# Patient Record
Sex: FEMALE | Race: WHITE | NOT HISPANIC OR LATINO | Employment: FULL TIME | URBAN - METROPOLITAN AREA
[De-identification: names, ages, dates, MRNs, and addresses within clinical notes are randomized per-mention and may not be internally consistent; named-entity substitution may affect disease eponyms.]

---

## 2023-08-15 ENCOUNTER — EVALUATION (OUTPATIENT)
Dept: PHYSICAL THERAPY | Facility: CLINIC | Age: 63
End: 2023-08-15
Payer: COMMERCIAL

## 2023-08-15 DIAGNOSIS — M70.61 TROCHANTERIC BURSITIS OF RIGHT HIP: Primary | ICD-10-CM

## 2023-08-15 PROCEDURE — 97161 PT EVAL LOW COMPLEX 20 MIN: CPT | Performed by: PHYSICAL THERAPIST

## 2023-08-15 PROCEDURE — 97112 NEUROMUSCULAR REEDUCATION: CPT | Performed by: PHYSICAL THERAPIST

## 2023-08-15 NOTE — PROGRESS NOTES
PT Evaluation     Today's date: 8/15/2023  Patient name: Steffany Roth  : 1960  MRN: [id number]  Referring provider: Chip Wright MD  Dx:   Encounter Diagnosis     ICD-10-CM    1. Trochanteric bursitis of right hip  M70.61                      Assessment  Assessment details: 8/15/2023: Steffany Roth is a 61 y.o. female who presents with pain, decreased strength, decreased ROM, postural dysfunction, c/o declining balance and tenderness w/ palpation R lateral hip. Due to these impairments, patient has difficulty performing ADL's, recreational activities, engaging in social activities, ambulation, stair negotiation, lifting/carrying, transfers. Patient's clinical presentation is consistent with their referring diagnosis of Trochanteric bursitis of right hip  (primary encounter diagnosis). She was treated for several months w/ chiropractic and SI joint dysfunction tests are negative. Hip MMT is strong and painfree except for mild weakness and discomfort w/ hip abd w/ associated mild TTP gr trochanter. Pt does respond w/ reduced symptoms during functional activities w/ mechanical assessment of lumbar spine indicating directional preference of lumbar extension. She was educated on potential of lumbar derangement and given HEP to test/treat this. If pt does not report continued relief w/ lumbar extension, repeated movement testing to R hip will be completed next visit. She will benefit from spine mechanical correction, LE and lumbar stretching and hip/core strengthening. We will progress to balance exercises prn based on pt concerns. Patient has been educated in home exercise program and plan of care.  Patient would benefit from skilled physical therapy services to address their aforementioned functional limitations and progress towards prior level of function and independence with home exercise program.     Impairments: abnormal gait, abnormal or restricted ROM, activity intolerance, impaired balance, impaired physical strength, lacks appropriate home exercise program, pain with function, weight-bearing intolerance and poor body mechanics    Goals  Short Term Goals to be accomplished in 4 weeks: (9/13/2023)  STG1: Pt will be I with HEP  STG2: Pt will report 50% reduction in R hip pain w/ stairs and upon waking. STG3: Pt will demo >/= 4+/5 MMT strength R hip abd w/o pain  STG4: IMrove lumbar ROM to [facility] w/o pain to allow improved effectiveness of lumbar mechanical correction exercises to reduce hip pain. Long Term Goals to be accomplished in 12 weeks: (11/7/2023)   LTG1: Pt will achieve full hip AROM to allow reciprocal stairs w/o pain and ease w/ LE dressing  LTG2: Pt will demo hip strength WNL to allow SLS x 30" without pain. LTG3: Pt will return to work/household duties as per PLOF without pain, reaching FOTO score of 69 or better. LTG4: Abolish pain after prolonged sitting/standing/walking allowing pt to walk 20 min or more and stand for an hour w/ no issues. LTG5: Achieve FGA score of 30/30. Plan  Plan details:       Patient would benefit from: PT eval and skilled physical therapy  Planned modality interventions: cryotherapy, thermotherapy: hydrocollator packs and unattended electrical stimulation  Planned therapy interventions: manual therapy, neuromuscular re-education, therapeutic activities, therapeutic exercise, home exercise program, stretching, patient education, postural training, balance/weight bearing training and functional ROM exercises  Frequency: 2x week  Plan of Care beginning date: 8/15/2023  Plan of Care expiration date: 11/7/2023  Treatment plan discussed with: patient        Subjective Evaluation    History of Present Illness  Mechanism of injury: Subjective 8/15/2023: Pt reports R lateral hip pain for more than a year. She did have chiropractic treatment for a few months with only short term relief. 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    Symptoms are intermittent, worse upon waking and w/ prolonged positions; sitting, standing or walking. She feels better as the day goes on. Pain is located in R lateral hip and does not move or radiate. She also mentions constant pain R foot first web space.   Patient Goals  Patient goals for therapy: decreased pain, improved balance and return to sport/leisure activities    Pain  At best pain ratin  At worst pain ratin  Quality: dull ache and tight  Progression: no change    Social Support    Employment status: working (teacher)    Diagnostic Tests  No diagnostic tests performed  Treatments  Previous treatment: chiropractic  Current treatment: physical therapy        Objective     A/PROM:  R  L     8/15/2023 8/15/2023  Hip flex sup  WNL  WNL  Hip ER prone P 50  55  Hip IR prone P  40  40    MMT:    R  L     8/15/2023 8/15/2023  Hip flex   4+/5  4+/5  Hip abd  4/5*  5/5  Hip ER   4+/5  4+/5  Hip IR   4+/5  4+/5  Hip ext   5/5  5/5  Knee ext  4+/5  4+/5  Knee flex  4+/5  4+/5    Lumbar screen: Lumbar AROM limitation     8/15/2023   Flexion:  min   Extension:  mod   R Side glide:  min   L Side glide: Min    DTR's:  8/15/2023: 3+ B/L L4; 2+ B/L S1    Mechanical Assessment:   8/15/2023: pre-test symptoms include 2/10 resting R lat hip pain; 4-5/10 lat hip pain w/ 8" fwd step up  REIL 1x10 = NE  EIL from qped 1x10 = abolish  R SLS 5/10 pain; EIL from qped 5"x5 = dec/B      Function:  8/15/2023 - stairs are intermittently reciprocal due to pain more w/ ascending vs descending;    Patient wakes w/ moderate pain   Moderate pain w/ raising from prolonged sitting   Limited standing/walking tolerance   Pain w/ R SLS     Gait:   8/15/2023 - WNL level surfaces   Pain w/ stairs, typically negotiates stairs non-recip due to pain    Palpation:   8/15/2023 - mild TTP R gr trochanter     Flexibility:   8/15/2023 - min kessler B/L quads; min/mod kessler hams 65 deg B/L; min kessler R hip ER w/ mild ERP      Outcome Measures Initial Eval  8/15/2023 5xSTS 13.09 sec        FGA 24/30        mCTSIB  - FTEO (firm)  - FTEC (firm)  - FTEO (foam)  - FTEC (foam)   60+ sec  60+ sec  60+ sec  NT sec        SLS R EO 30 sec+ 5/10 pain        SLS L EO 30 sec +             Cut off scores: All data taken from [employer] Neuro Section or Rehab Measures    Cr: <46/56=falls risk  MDC: 6 pts  Age Norms:  57-79: M - 54   F - 55  70-79: M - 47   F - 53  80-89: M - 48   F - 50 5xSTS: Amna et al 2010  MDC: 2.3 sec  Age Norms:  60-69: 11.1 sec  70-79: 12.6 sec  80-89: 14.8 sec   TUG  MDC: 4.14 sec  Cut off score:  >13.5 sec community dwelling adults  >32.2 sec frail elderly  <20 sec: I for basic transfers  >30: dependent for transfers 10 Meter Walk Test Norms: Watson Enciso and Clay banda 2011  20-29: M - 1.35 m   F - 1.34 m  30-39: M - 1.43 m   F - 1.34 m  40-49: M - 1.43 m   F - 1.39 m  50-59: M - 1.43 m   F - 1.31 m  60-69: M - 1.34 m   F - 1.24 m  70-79: M - 1.26 m   F - 1.13 m  80-89: M - 0.97 m   F - 0.94 m   FGA  MCID: 4 pts  Geriatrics/community < 22/30 fall risk  Geriatrics/community < 20/30 unexplained falls DGI  MDC: vestibular - 4 pts  MDC: geriatric/community - 3 pts  Falls risk <19/24   6 Minute Walk Test  Age Norms  57-79: M - 1876 ft (571.80 m)  F - 1765 ft (537.98 m)  70-79: M - 1729 ft (527.00 m)  F - 1545 ft (470.92 m)  80-89: M - 1368 ft (416.97 m)  F - 1286 ft (391.97 m) mCTSIB  Norm: 20-60 yrs  Eyes open firm: norm sway 0.21-0.48  Eyes closed firm: norm sway 0.48-0.99  Eyes open foam: norm sway 0.38-0.71  Eyes closed foam: norm sway 0.70-2.22          Precautions: History reviewed. No pertinent past medical history. Access Code: [email or web address]  URL: [email or web address]  Date: 08/15/2023  Prepared by:  Luis Alberto Bolivar    Exercises  - Quadruped Hip Drops  - 6 x daily - 10 reps - 3 hold    SOC: 8/15/0023  FOTO: 8/15/2023  POC EXP: 11/7/2023  DAILY TREATMENT LOG;  date 8/15/2023       Visit #/auth 1                               Neuro Re-Ed 10'       EIL from qped 3" 2x10       Supine sciatic NG        BONNIE w/ B/L knee flexion for fem NG                        Pt educ Lumbar roll; posture importance flex vs exten; import of HEP frequency       HEP Issued & reviewed       Ther Ex        Fig 4 stretch supine        Supine [facility]                                Trial of rep hip exten prn                Ther Activity                        Gait Training                        Modalities

## 2023-08-15 NOTE — LETTER
August 15, 2023    Frida Rivas MD  [address] [address]    Patient: Griselda Nettles   YOB: 1960   Date of Visit: 8/15/2023     Encounter Diagnosis     ICD-10-CM    1. Trochanteric bursitis of right hip  M70.61           Dear Dr. Ariella Swift: Thank you for your recent referral of Griselda Nettles. Please review the attached evaluation summary from Francia's recent visit. Please verify that you agree with the plan of care by signing the attached order. If you have any questions or concerns, please do not hesitate to call. I sincerely appreciate the opportunity to share in the care of one of your patients and hope to have another opportunity to work with you in the near future. Sincerely,    Gee Cortés, PT      Referring Provider:      I certify that I have read the below Plan of Care and certify the need for these services furnished under this plan of treatment while under my care. Frida Rivas MD  [facility]  Via Fax: [phone]          PT Evaluation     Today's date: 8/15/2023  Patient name: Griselda Nettles  : 1960  MRN: [id number]  Referring provider: Frida Rivas MD  Dx:   Encounter Diagnosis     ICD-10-CM    1. Trochanteric bursitis of right hip  M70.61                      Assessment  Assessment details: 8/15/2023: Griselda Nettles is a 61 y.o. female who presents with pain, decreased strength, decreased ROM, postural dysfunction, c/o declining balance and tenderness w/ palpation R lateral hip. Due to these impairments, patient has difficulty performing ADL's, recreational activities, engaging in social activities, ambulation, stair negotiation, lifting/carrying, transfers. Patient's clinical presentation is consistent with their referring diagnosis of Trochanteric bursitis of right hip  (primary encounter diagnosis). She was treated for several months w/ chiropractic and SI joint dysfunction tests are negative.  Hip MMT is strong and painfree except for mild weakness and discomfort w/ hip abd w/ associated mild TTP gr trochanter. Pt does respond w/ reduced symptoms during functional activities w/ mechanical assessment of lumbar spine indicating directional preference of lumbar extension. She was educated on potential of lumbar derangement and given HEP to test/treat this. If pt does not report continued relief w/ lumbar extension, repeated movement testing to R hip will be completed next visit. She will benefit from spine mechanical correction, LE and lumbar stretching and hip/core strengthening. We will progress to balance exercises prn based on pt concerns. Patient has been educated in home exercise program and plan of care. Patient would benefit from skilled physical therapy services to address their aforementioned functional limitations and progress towards prior level of function and independence with home exercise program.     Impairments: abnormal gait, abnormal or restricted ROM, activity intolerance, impaired balance, impaired physical strength, lacks appropriate home exercise program, pain with function, weight-bearing intolerance and poor body mechanics    Goals  Short Term Goals to be accomplished in 4 weeks: (9/13/2023)  STG1: Pt will be I with HEP  STG2: Pt will report 50% reduction in R hip pain w/ stairs and upon waking. STG3: Pt will demo >/= 4+/5 MMT strength R hip abd w/o pain  STG4: IMrove lumbar ROM to [facility] w/o pain to allow improved effectiveness of lumbar mechanical correction exercises to reduce hip pain. Long Term Goals to be accomplished in 12 weeks: (11/7/2023)   LTG1: Pt will achieve full hip AROM to allow reciprocal stairs w/o pain and ease w/ LE dressing  LTG2: Pt will demo hip strength WNL to allow SLS x 30" without pain. LTG3: Pt will return to work/household duties as per PLOF without pain, reaching FOTO score of 69 or better.   LTG4: Abolish pain after prolonged sitting/standing/walking allowing pt to walk 20 min or more and stand for an hour w/ no issues. LTG5: Achieve FGA score of 30/30. Plan  Plan details:       Patient would benefit from: PT eval and skilled physical therapy  Planned modality interventions: cryotherapy, thermotherapy: hydrocollator packs and unattended electrical stimulation  Planned therapy interventions: manual therapy, neuromuscular re-education, therapeutic activities, therapeutic exercise, home exercise program, stretching, patient education, postural training, balance/weight bearing training and functional ROM exercises  Frequency: 2x week  Plan of Care beginning date: 8/15/2023  Plan of Care expiration date: 2023  Treatment plan discussed with: patient        Subjective Evaluation    History of Present Illness  Mechanism of injury: Subjective 8/15/2023: Pt reports R lateral hip pain for more than a year. She did have chiropractic treatment for a few months with only short term relief. 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 Symptoms are intermittent, worse upon waking and w/ prolonged positions; sitting, standing or walking. She feels better as the day goes on. Pain is located in R lateral hip and does not move or radiate. She also mentions constant pain R foot first web space.   Patient Goals  Patient goals for therapy: decreased pain, improved balance and return to sport/leisure activities    Pain  At best pain ratin  At worst pain ratin  Quality: dull ache and tight  Progression: no change    Social Support    Employment status: working (teacher)    Diagnostic Tests  No diagnostic tests performed  Treatments  Previous treatment: chiropractic  Current treatment: physical therapy        Objective     A/PROM:  R  L     8/15/2023 8/15/2023  Hip flex sup  WNL  WNL  Hip ER prone P 50  55  Hip IR prone P  40  40    MMT:    R  L     8/15/2023 8/15/2023  Hip flex   4+/5  4+/5  Hip abd  4/5*  5/5  Hip ER   4+/5  4+/5  Hip IR   4+/5  4+/5  Hip ext   5/5  5/5  Knee ext  4+/5  4+/5  Knee flex  4+/5  4+/5    Lumbar screen: Lumbar AROM limitation     8/15/2023   Flexion:  min   Extension:  mod   R Side glide:  min   L Side glide: Min    DTR's:  8/15/2023: 3+ B/L L4; 2+ B/L S1    Mechanical Assessment:   8/15/2023: pre-test symptoms include 2/10 resting R lat hip pain; 4-5/10 lat hip pain w/ 8" fwd step up  REIL 1x10 = NE  EIL from qped 1x10 = abolish  R SLS 5/10 pain; EIL from qped 5"x5 = dec/B      Function:  8/15/2023 - stairs are intermittently reciprocal due to pain more w/ ascending vs descending;    Patient wakes w/ moderate pain   Moderate pain w/ raising from prolonged sitting   Limited standing/walking tolerance   Pain w/ R SLS     Gait:   8/15/2023 - WNL level surfaces   Pain w/ stairs, typically negotiates stairs non-recip due to pain    Palpation:   8/15/2023 - mild TTP R gr trochanter     Flexibility:   8/15/2023 - min kessler B/L quads; min/mod kessler hams 65 deg B/L; min kessler R hip ER w/ mild ERP      Outcome Measures Initial Eval  8/15/2023        5xSTS 13.09 sec        FGA 24/30        mCTSIB  - FTEO (firm)  - FTEC (firm)  - FTEO (foam)  - FTEC (foam)   60+ sec  60+ sec  60+ sec  NT sec        SLS R EO 30 sec+ 5/10 pain        SLS L EO 30 sec +             Cut off scores: All data taken from [employer] Neuro Section or Rehab Measures    Cr: <46/56=falls risk  MDC: 6 pts  Age Norms:  57-79: M - 54   F - 55  70-79: M - 47   F - 53  80-89: M - 48   F - 50 5xSTS: Amna et al 2010  MDC: 2.3 sec  Age Norms:  60-69: 11.1 sec  70-79: 12.6 sec  80-89: 14.8 sec   TUG  MDC: 4.14 sec  Cut off score:  >13.5 sec community dwelling adults  >32.2 sec frail elderly  <20 sec:  I for basic transfers  >30: dependent for transfers 10 Meter Walk Test Norms: Tosha banda 2011  20-29: M - 1.35 m   F - 1.34 m  30-39: M - 1.43 m   F - 1.34 m  40-49: M - 1.43 m   F - 1.39 m  50-59: M - 1.43 m   F - 1.31 m  60-69: M - 1.34 m   F - 1.24 m  70-79: M - 1.26 m   F - 1.13 m  80-89: M - 0.97 m   F - 0.94 m   FGA  MCID: 4 pts  Geriatrics/community < 22/30 fall risk  Geriatrics/community < 20/30 unexplained falls DGI  MDC: vestibular - 4 pts  MDC: geriatric/community - 3 pts  Falls risk <19/24   6 Minute Walk Test  Age Norms  57-79: M - 1876 ft (571.80 m)  F - 1765 ft (537.98 m)  70-79: M - 1729 ft (527.00 m)  F - 1545 ft (470.92 m)  80-89: M - 1368 ft (416.97 m)  F - 1286 ft (391.97 m) mCTSIB  Norm: 20-60 yrs  Eyes open firm: norm sway 0.21-0.48  Eyes closed firm: norm sway 0.48-0.99  Eyes open foam: norm sway 0.38-0.71  Eyes closed foam: norm sway 0.70-2.22         Precautions: History reviewed. No pertinent past medical history. Access Code: [email or web address]  URL: [email or web address]  Date: 08/15/2023  Prepared by:  Verenice Drake    Exercises  - Quadruped Hip Drops  - 6 x daily - 10 reps - 3 hold    SOC: 8/15/0023  FOTO: 8/15/2023  POC EXP: 11/7/2023  DAILY TREATMENT LOG;  date 8/15/2023       Visit #/auth 1                               Neuro Re-Ed 10'       EIL from qped 3" 2x10       Supine sciatic NG        BONNIE w/ B/L knee flexion for fem NG                        Pt educ Lumbar roll; posture importance flex vs exten; import of HEP frequency       HEP Issued & reviewed       Ther Ex        Fig 4 stretch supine        Supine [facility]                                Trial of rep hip exten prn                Ther Activity                        Gait Training                        Modalities

## 2023-08-15 NOTE — LETTER
August 15, 2023      No Recipients    Patient: Maria Del Carmen Brambila   YOB: 1960   Date of Visit: 8/15/2023     Encounter Diagnosis     ICD-10-CM    1. Trochanteric bursitis of right hip  M70.61           Dear Dr. Alva Marie Recipients: Thank you for your recent referral of Maria Del Carmen Brambila. Please review the attached evaluation summary from Francia's recent visit. Please verify that you agree with the plan of care by signing the attached order. If you have any questions or concerns, please do not hesitate to call. I sincerely appreciate the opportunity to share in the care of one of your patients and hope to have another opportunity to work with you in the near future. Sincerely,    Amrit Vera, PT      Referring Provider:      I certify that I have read the below Plan of Care and certify the need for these services furnished under this plan of treatment while under my care. No Recipients          PT Evaluation     Today's date: 8/15/2023  Patient name: Maria Del Carmen Brambila  : 1960  MRN: [id number]  Referring provider: Keenan Araujo MD  Dx:   Encounter Diagnosis     ICD-10-CM    1. Trochanteric bursitis of right hip  M70.61                      Assessment  Assessment details: 8/15/2023: Maria Del Carmen Brambila is a 61 y.o. female who presents with pain, decreased strength, decreased ROM, postural dysfunction, c/o declining balance and tenderness w/ palpation R lateral hip. Due to these impairments, patient has difficulty performing ADL's, recreational activities, engaging in social activities, ambulation, stair negotiation, lifting/carrying, transfers. Patient's clinical presentation is consistent with their referring diagnosis of Trochanteric bursitis of right hip  (primary encounter diagnosis). She was treated for several months w/ chiropractic and SI joint dysfunction tests are negative.  Hip MMT is strong and painfree except for mild weakness and discomfort w/ hip abd w/ associated mild TTP gr trochanter. Pt does respond w/ reduced symptoms during functional activities w/ mechanical assessment of lumbar spine indicating directional preference of lumbar extension. She was educated on potential of lumbar derangement and given HEP to test/treat this. If pt does not report continued relief w/ lumbar extension, repeated movement testing to R hip will be completed next visit. She will benefit from spine mechanical correction, LE and lumbar stretching and hip/core strengthening. We will progress to balance exercises prn based on pt concerns. Patient has been educated in home exercise program and plan of care. Patient would benefit from skilled physical therapy services to address their aforementioned functional limitations and progress towards prior level of function and independence with home exercise program.     Impairments: abnormal gait, abnormal or restricted ROM, activity intolerance, impaired balance, impaired physical strength, lacks appropriate home exercise program, pain with function, weight-bearing intolerance and poor body mechanics    Goals  Short Term Goals to be accomplished in 4 weeks: (9/13/2023)  STG1: Pt will be I with HEP  STG2: Pt will report 50% reduction in R hip pain w/ stairs and upon waking. STG3: Pt will demo >/= 4+/5 MMT strength R hip abd w/o pain  STG4: IMrove lumbar ROM to [facility] w/o pain to allow improved effectiveness of lumbar mechanical correction exercises to reduce hip pain. Long Term Goals to be accomplished in 12 weeks: (11/7/2023)   LTG1: Pt will achieve full hip AROM to allow reciprocal stairs w/o pain and ease w/ LE dressing  LTG2: Pt will demo hip strength WNL to allow SLS x 30" without pain. LTG3: Pt will return to work/household duties as per PLOF without pain, reaching FOTO score of 69 or better. LTG4: Abolish pain after prolonged sitting/standing/walking allowing pt to walk 20 min or more and stand for an hour w/ no issues.   LTG5: Achieve FGA score of 30/30. Plan  Plan details:       Patient would benefit from: PT eval and skilled physical therapy  Planned modality interventions: cryotherapy, thermotherapy: hydrocollator packs and unattended electrical stimulation  Planned therapy interventions: manual therapy, neuromuscular re-education, therapeutic activities, therapeutic exercise, home exercise program, stretching, patient education, postural training, balance/weight bearing training and functional ROM exercises  Frequency: 2x week  Plan of Care beginning date: 8/15/2023  Plan of Care expiration date: 2023  Treatment plan discussed with: patient        Subjective Evaluation    History of Present Illness  Mechanism of injury: Subjective 8/15/2023: Pt reports R lateral hip pain for more than a year. She did have chiropractic treatment for a few months with only short term relief. 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 Symptoms are intermittent, worse upon waking and w/ prolonged positions; sitting, standing or walking. She feels better as the day goes on. Pain is located in R lateral hip and does not move or radiate. She also mentions constant pain R foot first web space.   Patient Goals  Patient goals for therapy: decreased pain, improved balance and return to sport/leisure activities    Pain  At best pain ratin  At worst pain ratin  Quality: dull ache and tight  Progression: no change    Social Support    Employment status: working (teacher)    Diagnostic Tests  No diagnostic tests performed  Treatments  Previous treatment: chiropractic  Current treatment: physical therapy        Objective     A/PROM:  R  L     8/15/2023 8/15/2023  Hip flex sup  WNL  WNL  Hip ER prone P 50  55  Hip IR prone P  40  40    MMT:    R  L     8/15/2023 8/15/2023  Hip flex   4+/5  4+/5  Hip abd  4/5*  5/5  Hip ER   4+/5  4+/5  Hip IR   4+/5  4+/5  Hip ext   5/5  5/5  Knee ext  4+/5  4+/5  Knee flex  4+/5  4+/5    Lumbar screen: Lumbar AROM limitation     8/15/2023   Flexion:  min   Extension:  mod   R Side glide:  min   L Side glide: Min    DTR's:  8/15/2023: 3+ B/L L4; 2+ B/L S1    Mechanical Assessment:   8/15/2023: pre-test symptoms include 2/10 resting R lat hip pain; 4-5/10 lat hip pain w/ 8" fwd step up  REIL 1x10 = NE  EIL from qped 1x10 = abolish  R SLS 5/10 pain; EIL from qped 5"x5 = dec/B      Function:  8/15/2023 - stairs are intermittently reciprocal due to pain more w/ ascending vs descending;    Patient wakes w/ moderate pain   Moderate pain w/ raising from prolonged sitting   Limited standing/walking tolerance   Pain w/ R SLS     Gait:   8/15/2023 - WNL level surfaces   Pain w/ stairs, typically negotiates stairs non-recip due to pain    Palpation:   8/15/2023 - mild TTP R gr trochanter     Flexibility:   8/15/2023 - min kessler B/L quads; min/mod kessler hams 65 deg B/L; min kessler R hip ER w/ mild ERP      Outcome Measures Initial Eval  8/15/2023        5xSTS 13.09 sec        FGA 24/30        mCTSIB  - FTEO (firm)  - FTEC (firm)  - FTEO (foam)  - FTEC (foam)   60+ sec  60+ sec  60+ sec  NT sec        SLS R EO 30 sec+ 5/10 pain        SLS L EO 30 sec +             Cut off scores: All data taken from [facility] Neuro Section or Rehab Measures    Cr: <46/56=falls risk  MDC: 6 pts  Age Norms:  57-79: M - 54   F - 55  70-79: M - 47   F - 53  80-89: M - 48   F - 50 5xSTS: Amna et al 2010  MDC: 2.3 sec  Age Norms:  60-69: 11.1 sec  70-79: 12.6 sec  80-89: 14.8 sec   TUG  MDC: 4.14 sec  Cut off score:  >13.5 sec community dwelling adults  >32.2 sec frail elderly  <20 sec:  I for basic transfers  >30: dependent for transfers 10 Meter Walk Test Norms: Akashcuate Grossocent al 2011  20-29: M - 1.35 m   F - 1.34 m  30-39: M - 1.43 m   F - 1.34 m  40-49: M - 1.43 m   F - 1.39 m  50-59: M - 1.43 m   F - 1.31 m  60-69: M - 1.34 m   F - 1.24 m  70-79: M - 1.26 m   F - 1.13 m  80-89: M - 0.97 m   F - 0.94 m FGA  MCID: 4 pts  Geriatrics/community < 22/30 fall risk  Geriatrics/community < 20/30 unexplained falls DGI  MDC: vestibular - 4 pts  MDC: geriatric/community - 3 pts  Falls risk <19/24   6 Minute Walk Test  Age Norms  57-79: M - 1876 ft (571.80 m)  F - 1765 ft (537.98 m)  70-79: M - 1729 ft (527.00 m)  F - 1545 ft (470.92 m)  80-89: M - 1368 ft (416.97 m)  F - 1286 ft (391.97 m) mCTSIB  Norm: 20-60 yrs  Eyes open firm: norm sway 0.21-0.48  Eyes closed firm: norm sway 0.48-0.99  Eyes open foam: norm sway 0.38-0.71  Eyes closed foam: norm sway 0.70-2.22         Precautions: ***  Access Code: [email or web address]  URL: [email or web address]  Date: 08/15/2023  Prepared by:  Chinedu Molina    Exercises  - Quadruped Hip Drops  - 6 x daily - 10 reps - 3 hold    SOC: 8/15/0023  FOTO: 8/15/2023  POC EXP: 11/7/2023  DAILY TREATMENT LOG;  date 8/15/2023       Visit #/auth 1                               Neuro Re-Ed 10'       EIL from qped 3" 2x10       Supine sciatic NG        BONNIE w/ B/L knee flexion for fem NG                        Pt educ Lumbar roll; posture importance flex vs exten; import of HEP frequency       HEP Issued & reviewed       Ther Ex        Fig 4 stretch supine        Supine [facility]                                Trial of rep hip exten prn                Ther Activity                        Gait Training                        Modalities

## 2023-08-17 ENCOUNTER — OFFICE VISIT (OUTPATIENT)
Dept: PHYSICAL THERAPY | Facility: CLINIC | Age: 63
End: 2023-08-17
Payer: COMMERCIAL

## 2023-08-17 DIAGNOSIS — M70.61 TROCHANTERIC BURSITIS OF RIGHT HIP: Primary | ICD-10-CM

## 2023-08-17 PROCEDURE — 97110 THERAPEUTIC EXERCISES: CPT | Performed by: PHYSICAL THERAPIST

## 2023-08-17 PROCEDURE — 97112 NEUROMUSCULAR REEDUCATION: CPT | Performed by: PHYSICAL THERAPIST

## 2023-08-17 NOTE — PROGRESS NOTES
Daily Note     Today's date: 2023  Patient name: Dona Allen  : 1960  MRN: [id number]  Referring provider: Tarah Chu MD  Dx:   Encounter Diagnosis     ICD-10-CM    1. Trochanteric bursitis of right hip  M70.61                      Subjective: Pt reports her R hip pain is almost fully resolved w/ her HEP, her abdominal muscles are sore from qped hip drops. Objective: See treatment diary below  Lumbar AROM remains consistent w/ IE  Hip abd MMT 4+/5 - no pain, improved  R SLS mild pain. EiL from qped = dec/B  Stretches = NE  Rep hip ext knee on chair 1x10 = abolish    Assessment: Tolerated treatment well and just generally is not used to doing exercises. . Patient would benefit from continued PT and review of HEP updates today. Pt reports concern about whether her pain will increase again when school starts and she goes back to work. She wants to continue PT until she has an opportunity to test her response to working. Plan: Continue per plan of care. updated HEP. Precautions: History reviewed. No pertinent past medical history. Access Code: [email or web address]  URL: [email or web address]  Date: 08/15/2023  Prepared by:  Fe Paulino    Exercises  - Quadruped Hip Drops  - 6 x daily - 10 reps - 3 hold    SOC: 8/15/0023  FOTO: 8/15/2023  POC EXP: 2023  DAILY TREATMENT LOG;  date 8/15/2023 2023      Visit #/auth 1                               Neuro Re-Ed 10' 20'      EIL from qped 3" 2x10 1x10      Supine sciatic NG knee ext w/ 2 ankle pumps; SOS behind thigh  1x10 R/L      BONNIE w/ B/L knee flexion for fem NG  1x10                      Pt educ Lumbar roll; posture importance flex vs exten; import of HEP frequency       HEP Issued & reviewed Update & review      Ther Ex  20'      Fig 4 stretch supine  20"x2 R/L      Supine clamshells        bridges  1x10                              Trial of rep hip exten prn  R hip 1x10 = abolish              Ther Activity Gait Training                        Modalities                        HEP:  Access Code: [email or web address]  URL: [email or web address]  Date: 08/17/2023  Prepared by:  Pauline May    Exercises  - Quadruped Hip Drops  - 4 x daily - 10 reps - 3 hold  - Supine Bridge  - 1 x daily - 15 reps  - Supine Sciatic Nerve Glide  - 1 x daily - 15 reps  - Prone on elbows with knee flexion  - 1 x daily - 15 reps  - Standing Hip Flexor Stretch on Chair (Mirrored)  - 1 x daily - 10 reps

## 2023-08-22 ENCOUNTER — OFFICE VISIT (OUTPATIENT)
Dept: PHYSICAL THERAPY | Facility: CLINIC | Age: 63
End: 2023-08-22
Payer: COMMERCIAL

## 2023-08-22 DIAGNOSIS — M70.61 TROCHANTERIC BURSITIS OF RIGHT HIP: Primary | ICD-10-CM

## 2023-08-22 PROCEDURE — 97110 THERAPEUTIC EXERCISES: CPT

## 2023-08-22 NOTE — PROGRESS NOTES
Daily Note     Today's date: 2023  Patient name: Andrei Funez  : 1960  MRN: [id number]  Referring provider: Terrance Sims MD  Dx:   Encounter Diagnosis     ICD-10-CM    1. Trochanteric bursitis of right hip  M70.61                      Subjective: pt reports she is having more stiffness today than pain. She worked yesterday and today standing for most of the day and only has some tightness but not a return of her pain that she was having       Objective: See treatment diary below      Assessment: Tolerated treatment well. Pt dem decreased stiffness post session. Pt edu in alternative methods of lumbar ext now that she is back to work and unable to get on the floor at work she is do perform her REIL from QP at least 2x/day (AM/PM) and use the other tools t/o the day instead of completely ignoring them with her work routine, pt dem an understanding. Fatigued with posterolateral hip strengthening. Patient would benefit from continued PT      Plan: Continue per plan of care. Precautions: History reviewed. No pertinent past medical history. Access Code: [email or web address]  URL: [email or web address]  Date: 08/15/2023  Prepared by:  Rafael Doe    Exercises  - Quadruped Hip Drops  - 6 x daily - 10 reps - 3 hold    SOC: 8/15/0023  FOTO: 8/15/2023  POC EXP: 2023  DAILY TREATMENT LOG;  date 8/15/2023 2023 2023     Visit #/auth 1 2/12 3/12                             Neuro Re-Ed 10' 20' 10'     EIL from qped 3" 2x10 1x10 1x10 to end      REIL w/ onL LE on floor    1x10      Supine sciatic NG knee ext w/ 2 ankle pumps; SOS behind thigh  1x10 R/L 1x15 R/L      BONNIE w/ B/L knee flexion for fem NG  1x10 15x                      Pt educ Lumbar roll; posture importance flex vs exten; import of HEP frequency       HEP Issued & reviewed Update & review      Ther Ex  20' 25'     Fig 4 stretch supine  20"x2 R/L 20"x3 R/L      Supine clamshells   GTB 2x10      bridges  1x10 2x10      Standing hip abd/ext    1x10 ea R/L         YTB @ knees         YTB @ knees      Trial of rep hip exten prn  R hip 1x10 = abolish 1x10 R             Ther Activity                        Gait Training                        Modalities                        HEP:  Access Code: [email or web address]  URL: [email or web address]  Date: 08/17/2023  Prepared by:  Radha Hopper    Exercises  - Quadruped Hip Drops  - 4 x daily - 10 reps - 3 hold  - Supine Bridge  - 1 x daily - 15 reps  - Supine Sciatic Nerve Glide  - 1 x daily - 15 reps  - Prone on elbows with knee flexion  - 1 x daily - 15 reps  - Standing Hip Flexor Stretch on Chair (Mirrored)  - 1 x daily - 10 reps

## 2023-08-23 ENCOUNTER — APPOINTMENT (OUTPATIENT)
Dept: PHYSICAL THERAPY | Facility: CLINIC | Age: 63
End: 2023-08-23
Payer: COMMERCIAL

## 2023-08-29 ENCOUNTER — OFFICE VISIT (OUTPATIENT)
Dept: PHYSICAL THERAPY | Facility: CLINIC | Age: 63
End: 2023-08-29
Payer: COMMERCIAL

## 2023-08-29 DIAGNOSIS — M70.61 TROCHANTERIC BURSITIS OF RIGHT HIP: Primary | ICD-10-CM

## 2023-08-29 PROCEDURE — 97110 THERAPEUTIC EXERCISES: CPT

## 2023-08-29 PROCEDURE — 97112 NEUROMUSCULAR REEDUCATION: CPT

## 2023-08-29 NOTE — PROGRESS NOTES
Daily Note     Today's date: 2023  Patient name: Alie Reyes  : 1960  MRN: [id number]  Referring provider: Della Marie MD  Dx:   Encounter Diagnosis     ICD-10-CM    1. Trochanteric bursitis of right hip  M70.61                      Subjective: pt reports that she is feeling stiff today, she was doing a lot of sitting the last 2 days in meetings       Objective: See treatment diary below      Assessment: Tolerated treatment well. Pt dem decreased stiffness post session. Challenged with additional LE strengthening on leg press. Cont to progress posterolateral and core strengthening as tolerated with RTW. Patient would benefit from continued PT      Plan: Continue per plan of care. Precautions: History reviewed. No pertinent past medical history. Access Code: [email or web address]  URL: [email or web address]  Date: 08/15/2023  Prepared by:  Jose Mackey    Exercises  - Quadruped Hip Drops  - 6 x daily - 10 reps - 3 hold    SOC: 8/15/0023  FOTO: 8/15/2023  POC EXP: 2023  DAILY TREATMENT LOG;  date 8/15/2023 2023 2023 2023    Visit #/auth 1 2/12 3/12 4/12                            Neuro Re-Ed 10' 20' 10' 15'    EIL from qped 3" 2x10 1x10 1x10 to end  1x10; 1x10     REIL w/ onL LE on floor    1x10      Supine sciatic NG knee ext w/ 2 ankle pumps; SOS behind thigh  1x10 R/L 1x15 R/L  1x15 R/L     BONNIE w/ B/L knee flexion for fem NG  1x10 15x  15x     Prone quad stretch w/ sos     20"x3 R/L    Supine leg press     B/L 20# 2x10     Supine hip flex stretch over edge w/ sos     20"x3 R/L     Pt educ Lumbar roll; posture importance flex vs exten; import of HEP frequency       HEP Issued & reviewed Update & review      Ther Ex  20' 25' 25'    Fig 4 stretch supine  20"x2 R/L 20"x3 R/L  20"x3 R/L     Supine clamshells   GTB 2x10  GTB 2x10     bridges  1x10 2x10  2x10     Standing hip abd/ext    1x10 ea R/L  YTB @ knees 1x10 ea R/L     Side stepping   YTB @ knees 20'x2 YTB @ knees 20'x2 Monster walks    YTB @ knees 20'x2 YTB @ knees 20'x2     Trial of rep hip exten prn  R hip 1x10 = abolish 1x10 R             Ther Activity                        Gait Training                        Modalities                        HEP:  Access Code: [email or web address]  URL: [email or web address]  Date: 08/17/2023  Prepared by:  Gilma Espinal    Exercises  - Quadruped Hip Drops  - 4 x daily - 10 reps - 3 hold  - Supine Bridge  - 1 x daily - 15 reps  - Supine Sciatic Nerve Glide  - 1 x daily - 15 reps  - Prone on elbows with knee flexion  - 1 x daily - 15 reps  - Standing Hip Flexor Stretch on Chair (Mirrored)  - 1 x daily - 10 reps

## 2023-08-31 ENCOUNTER — OFFICE VISIT (OUTPATIENT)
Dept: PHYSICAL THERAPY | Facility: CLINIC | Age: 63
End: 2023-08-31
Payer: COMMERCIAL

## 2023-08-31 DIAGNOSIS — M70.61 TROCHANTERIC BURSITIS OF RIGHT HIP: Primary | ICD-10-CM

## 2023-08-31 PROCEDURE — 97112 NEUROMUSCULAR REEDUCATION: CPT | Performed by: PHYSICAL THERAPIST

## 2023-08-31 PROCEDURE — 97110 THERAPEUTIC EXERCISES: CPT | Performed by: PHYSICAL THERAPIST

## 2023-08-31 PROCEDURE — 97140 MANUAL THERAPY 1/> REGIONS: CPT | Performed by: PHYSICAL THERAPIST

## 2023-08-31 NOTE — PROGRESS NOTES
Daily Note     Today's date: 2023  Patient name: Oralia Grider  : 1960  MRN: [id number]  Referring provider: Chandrakant Morgan MD  Dx:   Encounter Diagnosis     ICD-10-CM    1. Trochanteric bursitis of right hip  M70.61                      Subjective: Pt reports no pain upon arrival, just some minor stiffness and general fatigue after RTW this week. Pt had no c/o except pain w/ R LE fwd step up. Pt states this has been painful as long as she can remember, but is hard for her to monitor since she doesn't do stairs often. Objective: See treatment diary below  RE: R hip pain w/ step up:  EIL from qped = NE  Prone lumb ext mobs = NE  Rep hip ext knee on chair 1x10 = NE  Rep hip ext manual in SL 2x10 = NE    Assessment: Tolerated treatment well and demonstrates improved lumbar mobility and reports decreased pain overall. . Patient would benefit from continued PT  Pain w/ fwd step up R LE unchanged today w/ intervention. Plan: Continue per plan of care.       SOC: 8/15/0023  FOTO: 8/15/2023  POC EXP: 2023  DAILY TREATMENT LOG;  date 8/15/2023 2023 2023 2023 2023   Visit #/auth 1 2/12 3/12 4/12 5/12   manual     10'   R hip exten stretch     2x10 = NE R fwd stepup   Prone lumb exten mobs     Gr 3-4 NE R fwd stepup   Neuro Re-Ed 10' 20' 10' 15' 15'   Slouch overcorrect     1x10   EIL from qped 3" 2x10 1x10 1x10 to end  1x10; 1x10  1x10   REIL w/ onL LE on floor    1x10      Supine sciatic NG knee ext w/ 2 ankle pumps; SOS behind thigh  1x10 R/L 1x15 R/L  1x15 R/L  1x15 R/L   BONNIE w/ B/L knee flexion for fem NG  1x10 15x  15x  15x   Prone quad stretch w/ sos     20"x3 R/L 20"x3 ea R/L semi prone contra foot on floor   Supine leg press     B/L 20# 2x10     Supine hip flex stretch over edge w/ sos     20"x3 R/L     Pt educ Lumbar roll; posture importance flex vs exten; import of HEP frequency       HEP Issued & reviewed Update & review      Ther Ex  20' 25' 25' 15'   Fig 4 stretch supine  20"x2 R/L 20"x3 R/L  20"x3 R/L  20"x3 R/L   Supine clamshells   GTB 2x10  GTB 2x10     bridges  1x10 2x10  2x10  W/ TB abd 2x10   Standing hip abd/ext    1x10 ea R/L  YTB @ knees 1x10 ea R/L  Steamboats RTB @ knees 1x10 R/L   Side stepping   YTB @ knees 20'x2 YTB @ knees 20'x2 RTB @ knees 20'x2 R/L   Monster walks    YTB @ knees 20'x2 YTB @ knees 20'x2     Trial of rep hip exten prn  R hip 1x10 = abolish 1x10 R  1x10           Ther Activity                        Gait Training                        Modalities                        HEP:  Access Code: [email or web address]  URL: [email or web address]  Date: 08/17/2023  Prepared by:  Cece Marquis    Exercises  - Quadruped Hip Drops  - 4 x daily - 10 reps - 3 hold  - Supine Bridge  - 1 x daily - 15 reps  - Supine Sciatic Nerve Glide  - 1 x daily - 15 reps  - Prone on elbows with knee flexion  - 1 x daily - 15 reps  - Standing Hip Flexor Stretch on Chair (Mirrored)  - 1 x daily - 10 reps

## 2023-09-05 ENCOUNTER — OFFICE VISIT (OUTPATIENT)
Dept: PHYSICAL THERAPY | Facility: CLINIC | Age: 63
End: 2023-09-05
Payer: COMMERCIAL

## 2023-09-05 DIAGNOSIS — M70.61 TROCHANTERIC BURSITIS OF RIGHT HIP: Primary | ICD-10-CM

## 2023-09-05 PROCEDURE — 97110 THERAPEUTIC EXERCISES: CPT | Performed by: PHYSICAL THERAPIST

## 2023-09-05 PROCEDURE — 97112 NEUROMUSCULAR REEDUCATION: CPT | Performed by: PHYSICAL THERAPIST

## 2023-09-05 NOTE — PROGRESS NOTES
Daily Note     Today's date: 2023  Patient name: Barb Mora  : 1960  MRN: [id number]  Referring provider: Ruchi Davis MD  Dx:   Encounter Diagnosis     ICD-10-CM    1. Trochanteric bursitis of right hip  M70.61                      Subjective: Pt reports she is much better overall, waking w/ much less stiffness and tolerating more walking/weight bearing activity. Objective: See treatment diary below      Assessment: Tolerated treatment well and reports fatigue w/ current program. Patient would benefit from continued PT and progression of functional activities as tolerated. Plan: Continue per plan of care.       SOC: 8/15/0023  FOTO: 8/15/2023  POC EXP: 2023  DAILY TREATMENT LOG;  date 2023   Visit #/auth 6/12 2/12 3/12 4/12 5/12   manual     10'   R hip exten stretch     2x10 = NE R fwd stepup   Prone lumb exten mobs     Gr 3-4 NE R fwd stepup   Neuro Re-Ed 15' 20' 10' 15' 15'   Slouch overcorrect     1x10   EIL from qped 1x10 1x10 1x10 to end  1x10; 1x10  1x10   REIL w/ onL LE on floor    1x10      Supine sciatic NG knee ext w/ 2 ankle pumps; SOS behind thigh 1x10 R/L sit 1x10 R/L 1x15 R/L  1x15 R/L  1x15 R/L   BONNIE w/ B/L knee flexion for fem NG 15x 1x10 15x  15x  15x   Prone quad stretch w/ sos     20"x3 R/L 20"x3 ea R/L semi prone contra foot on floor   upright leg press  30# 2x10 B/L   B/L 20# 2x10     Supine hip flex stretch over edge w/ sos  30"x3 R/L   20"x3 R/L     Pt educ        HEP  Update & review      Ther Ex 15' 20' 25' 25' 15'   Fig 4 stretch supine 20"x3 R/L 20"x2 R/L 20"x3 R/L  20"x3 R/L  20"x3 R/L   Supine clamshells   GTB 2x10  GTB 2x10     bridges W/ TB abd 2x10 grn 1x10 2x10  2x10  W/ TB abd grn 2x10   Standing hip abd/ext  RTB @ knees 2x10 ea R/L  1x10 ea R/L  YTB @ knees 1x10 ea R/L  Steamboats RTB @ knees 1x10 R/L   Side stepping   YTB @ knees 20'x2 YTB @ knees 20'x2 RTB @ knees 20'x2 R/L   Monster walks    YTB @ knees 20'x2 YTB @ knees 20'x2     Trial of rep hip exten prn  R hip 1x10 = abolish 1x10 R  1x10   High march walking 10'x4       Ther Activity                        Gait Training                        Modalities                        HEP:  Access Code: [email or web address]  URL: [email or web address]  Date: 08/17/2023  Prepared by:  Teresa Arrington    Exercises  - Quadruped Hip Drops  - 4 x daily - 10 reps - 3 hold  - Supine Bridge  - 1 x daily - 15 reps  - Supine Sciatic Nerve Glide  - 1 x daily - 15 reps  - Prone on elbows with knee flexion  - 1 x daily - 15 reps  - Standing Hip Flexor Stretch on Chair (Mirrored)  - 1 x daily - 10 reps

## 2023-09-07 ENCOUNTER — OFFICE VISIT (OUTPATIENT)
Dept: PHYSICAL THERAPY | Facility: CLINIC | Age: 63
End: 2023-09-07
Payer: COMMERCIAL

## 2023-09-07 DIAGNOSIS — M70.61 TROCHANTERIC BURSITIS OF RIGHT HIP: Primary | ICD-10-CM

## 2023-09-07 PROCEDURE — 97112 NEUROMUSCULAR REEDUCATION: CPT | Performed by: PHYSICAL THERAPIST

## 2023-09-07 PROCEDURE — 97110 THERAPEUTIC EXERCISES: CPT | Performed by: PHYSICAL THERAPIST

## 2023-09-07 NOTE — PROGRESS NOTES
Daily Note     Today's date: 2023  Patient name: Darian Jain  : 1960  MRN: [id number]  Referring provider: Thea Godfrey MD  Dx:   Encounter Diagnosis     ICD-10-CM    1. Trochanteric bursitis of right hip  M70.61                      Subjective: Pt reports she's pretty close to being back to normal. She's been walking a lot at school (work) and is doing OK w/ it. Objective: See treatment diary below      Assessment: Tolerated treatment well. Patient nearing D/C. Plan: Progress note during next visit. Potential D/C next week.      SOC: 8/15/0023  FOTO: 8/15/2023  POC EXP: 2023  DAILY TREATMENT LOG;  date 2023   Visit #/auth    manual     10'   R hip exten stretch     2x10 = NE R fwd stepup   Prone lumb exten mobs     Gr 3-4 NE R fwd stepup   Neuro Re-Ed 15' '  15' 15'   Slouch overcorrect     1x10   EIL from qped 1x10 1x10  1x10; 1x10  1x10   SLS w/ fwd reach w/ rail  2x5 R/L      Supine sciatic NG knee ext w/ 2 ankle pumps; SOS behind thigh 1x10 R/L sit 1x15 R/L  1x15 R/L  1x15 R/L   BONNIE w/ B/L knee flexion for fem NG 15x 2x15  15x  15x   Prone quad stretch w/ sos     20"x3 R/L 20"x3 ea R/L semi prone contra foot on floor   upright leg press  30# 2x10 B/L 30# 2x10 B/L  B/L 20# 2x10     Supine hip flex stretch over edge w/ sos  30"x3 R/L Hold per pt request  20"x3 R/L     Pt educ        HEP        Ther Ex 15' 15'  ' 15'   Fig 4 stretch supine 20"x3 R/L 20"x3 R/L  20"x3 R/L  20"x3 R/L   Supine clamshells    GTB 2x10     bridges W/ TB abd 2x10 grn W/ grn TB abd 2x15  2x10  W/ TB abd grn 2x10   Standing hip abd/ext  RTB @ knees 2x10 ea R/L RTB @ ankles   YTB @ knees 1x10 ea R/L  Steamboats RTB @ knees 1x10 R/L   Side stepping  RTB @ ankles 10'x1 R/L  YTB @ knees 20'x2 RTB @ knees 20'x2 R/L   Monster walks   RTB @ ankles 10'x2  YTB @ knees 20'x2     Trial of rep hip exten prn     1x10   High march walking 10'x4 10'x6      Ther Activity                        Gait Training                        Modalities                        HEP:  Access Code: [email or web address]  URL: [email or web address]  Date: 08/17/2023  Prepared by:  Aruna Troncoso    Exercises  - Quadruped Hip Drops  - 4 x daily - 10 reps - 3 hold  - Supine Bridge  - 1 x daily - 15 reps  - Supine Sciatic Nerve Glide  - 1 x daily - 15 reps  - Prone on elbows with knee flexion  - 1 x daily - 15 reps  - Standing Hip Flexor Stretch on Chair (Mirrored)  - 1 x daily - 10 reps

## 2023-09-11 ENCOUNTER — EVALUATION (OUTPATIENT)
Dept: PHYSICAL THERAPY | Facility: CLINIC | Age: 63
End: 2023-09-11
Payer: COMMERCIAL

## 2023-09-11 DIAGNOSIS — M70.61 TROCHANTERIC BURSITIS OF RIGHT HIP: Primary | ICD-10-CM

## 2023-09-11 PROCEDURE — 97110 THERAPEUTIC EXERCISES: CPT | Performed by: PHYSICAL THERAPIST

## 2023-09-11 PROCEDURE — 97112 NEUROMUSCULAR REEDUCATION: CPT | Performed by: PHYSICAL THERAPIST

## 2023-09-11 NOTE — PROGRESS NOTES
PT Discharge    Today's date: 2023  Patient name: Cat Mora  : 1960  MRN: [id number]  Referring provider: Von Dumas MD  Dx:   Encounter Diagnosis     ICD-10-CM    1. Trochanteric bursitis of right hip  M70.61                      Assessment  Assessment details: 2023: Pt has attended 8 skilled PT visits and reports near full symptom resolution stating she feels ready to D/C to HEP. ROM, MMT, balance and functional tests are now WNL. Pt has met all goals. D/C to HEP. 8/15/2023: Cat Mora is a 61 y.o. female who presents with pain, decreased strength, decreased ROM, postural dysfunction, c/o declining balance and tenderness w/ palpation R lateral hip. Due to these impairments, patient has difficulty performing ADL's, recreational activities, engaging in social activities, ambulation, stair negotiation, lifting/carrying, transfers. Patient's clinical presentation is consistent with their referring diagnosis of Trochanteric bursitis of right hip  (primary encounter diagnosis). She was treated for several months w/ chiropractic and SI joint dysfunction tests are negative. Hip MMT is strong and painfree except for mild weakness and discomfort w/ hip abd w/ associated mild TTP gr trochanter. Pt does respond w/ reduced symptoms during functional activities w/ mechanical assessment of lumbar spine indicating directional preference of lumbar extension. She was educated on potential of lumbar derangement and given HEP to test/treat this. If pt does not report continued relief w/ lumbar extension, repeated movement testing to R hip will be completed next visit. She will benefit from spine mechanical correction, LE and lumbar stretching and hip/core strengthening. We will progress to balance exercises prn based on pt concerns. Patient has been educated in home exercise program and plan of care.  Patient would benefit from skilled physical therapy services to address their aforementioned functional limitations and progress towards prior level of function and independence with home exercise program.     Impairments: lacks appropriate home exercise program    Goals  Short Term Goals to be accomplished in 4 weeks: (9/13/2023) - met all STG's  STG1: Pt will be I with HEP  STG2: Pt will report 50% reduction in R hip pain w/ stairs and upon waking. STG3: Pt will demo >/= 4+/5 MMT strength R hip abd w/o pain  STG4: IMrove lumbar ROM to [facility] w/o pain to allow improved effectiveness of lumbar mechanical correction exercises to reduce hip pain. Long Term Goals to be accomplished in 12 weeks: (11/7/2023) - met all LTG's  LTG1: Pt will achieve full hip AROM to allow reciprocal stairs w/o pain and ease w/ LE dressing  LTG2: Pt will demo hip strength WNL to allow SLS x 30" without pain. LTG3: Pt will return to work/household duties as per PLOF without pain, reaching FOTO score of 69 or better. LTG4: Abolish pain after prolonged sitting/standing/walking allowing pt to walk 20 min or more and stand for an hour w/ no issues. LTG5: Achieve FGA score of 30/30. Plan  Plan details:       Planned therapy interventions: home exercise program  Frequency: 2x week  Plan of Care beginning date: 8/15/2023  Plan of Care expiration date: 11/7/2023  Treatment plan discussed with: patient        Subjective Evaluation    History of Present Illness  Mechanism of injury: Subjective   9/11/2023: Pt reports she is "much better" and her pain is 1/10 at worst intermittently. She states she feels ready to D/c to HEP. 8/15/2023: Pt reports R lateral hip pain for more than a year. She did have chiropractic treatment for a few months with only short term relief. 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 Symptoms are intermittent, worse upon waking and w/ prolonged positions; sitting, standing or walking. She feels better as the day goes on.    Pain is located in R lateral hip and does not move or radiate. She also mentions constant pain R foot first web space.   Patient Goals  Patient goals for therapy: decreased pain, improved balance and return to sport/leisure activities    Pain  At best pain ratin  At worst pain ratin  Quality: dull ache and tight  Progression: improved    Social Support    Employment status: working (teacher)    Diagnostic Tests  No diagnostic tests performed  Treatments  Previous treatment: chiropractic  Current treatment: physical therapy        Objective     A/PROM:  R  L  R  L     2023 2023 8/15/2023 8/15/2023  Hip flex sup A  WNL  WNL  WNL  WNL  Hip ER prone P 60  60  50  55  Hip IR prone P  45  45  40  40    MMT:    R  L  R  L     2023 2023 8/15/2023 8/15/2023  Hip flex   5/5  5/5  4+/5  4+/5  Hip abd  5/5  4/5*  5/5  Hip ER   5/5  5/5  4+/5  4+/5  Hip IR   5/5  5/5  4+/5  4+/5  Hip ext   5/5  5/5  5/5  5/5  Knee ext  5/5  5/5  4+/5  4+/5  Knee flex  5/5  5/5  4+/5  4+/5    Lumbar screen: Lumbar AROM limitation     8/15/2023   Flexion:  min   Extension:  mod   R Side glide:  min   L Side glide: Min    DTR's:  8/15/2023: 3+ B/L L4; 2+ B/L S1    Mechanical Assessment:   8/15/2023: pre-test symptoms include 2/10 resting R lat hip pain; 4-5/10 lat hip pain w/ 8" fwd step up  REIL 1x10 = NE  EIL from qped 1x10 = abolish  R SLS 5/10 pain; EIL from qped 5"x5 = dec/B      Function:  8/15/2023 - stairs are intermittently reciprocal due to pain more w/ ascending vs descending;    Patient wakes w/ moderate pain   Moderate pain w/ raising from prolonged sitting   Limited standing/walking tolerance   Pain w/ R SLS     Gait:   8/15/2023 - WNL level surfaces   Pain w/ stairs, typically negotiates stairs non-recip due to pain    Palpation:   8/15/2023 - mild TTP R gr trochanter     Flexibility:   2023 - WNL B/L hams 80 deg  8/15/2023 - min kessler B/L quads; min/mod kessler hams 65 deg B/L; min kessler R hip ER w/ mild ERP      Outcome Measures Initial Eval  8/15/2023   RE 9/11/2023       5xSTS 13.09 sec 13sec       FGA 24/30 29 sec       mCTSIB  - FTEO (firm)  - FTEC (firm)  - FTEO (foam)  - FTEC (foam)   60+ sec  60+ sec  60+ sec  NT sec   60+ sec  60+ sec  60+ sec  NT       SLS R EO 30 sec+ 5/10 pain 30+ sec 0/10 pain       SLS L EO 30 sec + 30 sec 0/10 pain            Cut off scores: All data taken from [facility] Neuro Section or Rehab Measures    Cr: <46/56=falls risk  MDC: 6 pts  Age Norms:  57-79: M - 54   F - 55  70-79: M - 47   F - 53  80-89: M - 48   F - 50 5xSTS: Amna et al 2010  MDC: 2.3 sec  Age Norms:  60-69: 11.1 sec  70-79: 12.6 sec  80-89: 14.8 sec   TUG  MDC: 4.14 sec  Cut off score:  >13.5 sec community dwelling adults  >32.2 sec frail elderly  <20 sec: I for basic transfers  >30: dependent for transfers 10 Meter Walk Test Norms: Leonor Linn and Robert Morin al 2011  20-29: M - 1.35 m   F - 1.34 m  30-39: M - 1.43 m   F - 1.34 m  40-49: M - 1.43 m   F - 1.39 m  50-59: M - 1.43 m   F - 1.31 m  60-69: M - 1.34 m   F - 1.24 m  70-79: M - 1.26 m   F - 1.13 m  80-89: M - 0.97 m   F - 0.94 m   FGA  MCID: 4 pts  Geriatrics/community < 22/30 fall risk  Geriatrics/community < 20/30 unexplained falls DGI  MDC: vestibular - 4 pts  MDC: geriatric/community - 3 pts  Falls risk <19/24   6 Minute Walk Test  Age Norms  57-79: M - 1876 ft (571.80 m)  F - 1765 ft (537.98 m)  70-79: M - 1729 ft (527.00 m)  F - 1545 ft (470.92 m)  80-89: M - 1368 ft (416.97 m)  F - 1286 ft (391.97 m) mCTSIB  Norm: 20-60 yrs  Eyes open firm: norm sway 0.21-0.48  Eyes closed firm: norm sway 0.48-0.99  Eyes open foam: norm sway 0.38-0.71  Eyes closed foam: norm sway 0.70-2.22          Precautions: History reviewed. No pertinent past medical history.   SOC: 8/15/0023  FOTO: 8/15/2023  POC EXP: 11/7/2023  DAILY TREATMENT LOG;  date 9/5/2023 9/7/2023 9/11/2023  RE     Visit #/auth 6/12 7/12 8/12     manual        R hip exten stretch        Prone lumb exten mobs        Neuro Re-Ed 15' 20' 20'     EIL from qped 1x10 1x10 1x10     SLS w/ fwd reach w/ rail  2x5 R/L 2x5 R/L     Supine sciatic NG knee ext w/ 2 ankle pumps; SOS behind thigh 1x10 R/L sit 1x15 R/L 1x15 R/L     BONNIE w/ B/L knee flexion for fem NG 15x 2x15 2X15     upright leg press  30# 2x10 B/L 30# 2x10 B/L 35# 2x10     Supine hip flex stretch over edge w/ sos  30"x3 R/L Hold per pt request      Balance & funct tests   TT     HEP        Ther Ex 15' 15' 25'     Fig 4 stretch supine 20"x3 R/L 20"x3 R/L      bridges W/ TB abd 2x10 grn W/ grn TB abd 2x15 W/ grn TB 2x15     Standing hip abd/ext  RTB @ knees 2x10 ea R/L RTB @ ankles 2x10 ea R/L RTB @ ankles 2x10 R/L     Side stepping  RTB @ ankles 10'x1 R/L RTB @ ankles 15'x1 R/L     Monster walks   RTB @ ankles 10'x2 15'x2 RTB @ ankles     High march walking 10'x4 10'x6 20'x2     ROM/MMT   TT     Ther Activity                        Gait Training                        Modalities                        HEP:  Access Code: [email or web address]  URL: [email or web address]  Date: 08/17/2023  Prepared by:  Vladislav Ellison    Exercises  - Quadruped Hip Drops  - 4 x daily - 10 reps - 3 hold  - Supine Bridge  - 1 x daily - 15 reps  - Supine Sciatic Nerve Glide  - 1 x daily - 15 reps  - Prone on elbows with knee flexion  - 1 x daily - 15 reps  - Standing Hip Flexor Stretch on Chair (Mirrored)  - 1 x daily - 10 reps

## 2023-09-14 ENCOUNTER — APPOINTMENT (OUTPATIENT)
Dept: PHYSICAL THERAPY | Facility: CLINIC | Age: 63
End: 2023-09-14
Payer: COMMERCIAL

## 2023-09-18 ENCOUNTER — APPOINTMENT (OUTPATIENT)
Dept: PHYSICAL THERAPY | Facility: CLINIC | Age: 63
End: 2023-09-18
Payer: COMMERCIAL

## 2023-09-19 ENCOUNTER — APPOINTMENT (OUTPATIENT)
Dept: PHYSICAL THERAPY | Facility: CLINIC | Age: 63
End: 2023-09-19
Payer: COMMERCIAL

## 2023-09-25 ENCOUNTER — APPOINTMENT (OUTPATIENT)
Dept: PHYSICAL THERAPY | Facility: CLINIC | Age: 63
End: 2023-09-25
Payer: COMMERCIAL

## 2023-09-28 ENCOUNTER — APPOINTMENT (OUTPATIENT)
Dept: PHYSICAL THERAPY | Facility: CLINIC | Age: 63
End: 2023-09-28
Payer: COMMERCIAL

## 2023-10-26 ENCOUNTER — TELEPHONE (OUTPATIENT)
Age: 63
End: 2023-10-26

## 2023-10-26 NOTE — TELEPHONE ENCOUNTER
Pt called in to confirm that we received her medical records from previous GI doc. Please reach out to pt once those have been received.

## 2023-10-27 RX ORDER — FAMOTIDINE 40 MG/1
40 TABLET, FILM COATED ORAL DAILY
COMMUNITY
Start: 2023-08-08

## 2023-10-27 RX ORDER — ROSUVASTATIN CALCIUM 5 MG/1
TABLET, COATED ORAL
COMMUNITY

## 2023-10-27 RX ORDER — OMEPRAZOLE 40 MG/1
CAPSULE, DELAYED RELEASE ORAL
COMMUNITY
Start: 2023-08-18 | End: 2023-10-31

## 2023-10-27 RX ORDER — ELUXADOLINE 100 MG/1
TABLET, FILM COATED ORAL
COMMUNITY

## 2023-10-27 RX ORDER — ERGOCALCIFEROL 1.25 MG/1
CAPSULE ORAL
COMMUNITY

## 2023-10-31 ENCOUNTER — OFFICE VISIT (OUTPATIENT)
Dept: GASTROENTEROLOGY | Facility: CLINIC | Age: 63
End: 2023-10-31
Payer: COMMERCIAL

## 2023-10-31 ENCOUNTER — TELEPHONE (OUTPATIENT)
Dept: GASTROENTEROLOGY | Facility: CLINIC | Age: 63
End: 2023-10-31

## 2023-10-31 VITALS
HEART RATE: 80 BPM | WEIGHT: 156.4 LBS | HEIGHT: 64 IN | BODY MASS INDEX: 26.7 KG/M2 | DIASTOLIC BLOOD PRESSURE: 84 MMHG | SYSTOLIC BLOOD PRESSURE: 126 MMHG

## 2023-10-31 DIAGNOSIS — K58.0 IRRITABLE BOWEL SYNDROME WITH DIARRHEA: Primary | ICD-10-CM

## 2023-10-31 PROCEDURE — 99204 OFFICE O/P NEW MOD 45 MIN: CPT | Performed by: INTERNAL MEDICINE

## 2023-10-31 NOTE — PROGRESS NOTES
Consultation - [address] [place] [address] [facility]  [facility] 1960 61 y.o. female     ASSESSMENT @ PLAN:   She is a 60-year-old female with lifelong to severe IBS-D who has had a partial incomplete response to Viberzi. She has been on this agent for 6 years and is worried about long-term side effects which we reviewed. She had a normal endoscopy in 2022 and a normal colonoscopy in 2018 and is followed with a GI in [facility] until now. She is here for second opinion. 1 we will give her Xifaxan for treatment of IBS-D    2 if she does not respond to the Xifaxan I will give her colestipol versus cholestyramine trial at a high dose    3 for now we will continue on the Viberzi. She does report significant constipation with a twice daily dose that she is taking 100 mg once a day. I told her we will not stop this. 4 I briefly reviewed Lotronex with her. 5 we will order fecal calprotectin and fecal elastase testing with inflammatory markers. She recently had normal blood work and normal celiac serology. Chief Complaint: IBS-D diarrhea    HPI: She is a 60-year-old female with moderate to severe IBS-D which is lifelong. She has been following with a GI doctor in [facility] and is here to change services and to change care. She has been on Viberzi for 6 years. She recently has been doing 100 mg once a day but if she goes out for a large meal she will take an extra dose. If she does not take it over the weekend that she has severe diarrhea by Monday. She will have 4-6 stools at baseline with urgency and frequency. Even on this dosing of Viberzi she has had 3 episodes of fecal incontinence in the last month. Obviously this is very distressing for her she is a teacher in the seventh grade and she is terrified to go off of medicine and terrified when she has cramping and urgency to go to the bathroom. She recently had negative celiac serology.   She had a normal colonoscopy in 2018 and negative endoscopy in . She has been tried on probiotic therapy fiber supplementation antispasmodics and TCAs in the past.  These agents have not helped her. She has not had a cholestyramine trial or Lotronex trial.  She tells me that the FODMAP diet is very hard to do and she has limited eating habits at present anyway. Prior to going on Viberzi she was taking 4-6 Imodium's a day in order to function. She has no fevers chills weight loss melena or hematochezia. She does not drink alcohol. She has no significant upper abdominal symptoms no significant heartburn regurgitation dysphagia nausea vomiting. REVIEW OF SYSTEMS:     CONSTITUTIONAL: Denies any fever, chills, or rigors. Good appetite, and no recent weight loss. HEENT: No earache or tinnitus. Denies hearing loss or visual disturbances. CARDIOVASCULAR: No chest pain or palpitations. RESPIRATORY: Denies any cough, hemoptysis, shortness of breath or dyspnea on exertion. GASTROINTESTINAL: As noted in the History of Present Illness. GENITOURINARY: No problems with urination. Denies any hematuria or dysuria. NEUROLOGIC: No dizziness or vertigo, denies headaches. MUSCULOSKELETAL: Denies any muscle or joint pain. SKIN: Denies skin rashes or itching. ENDOCRINE: Denies excessive thirst. Denies intolerance to heat or cold. PSYCHOSOCIAL: Denies depression or anxiety. Denies any recent memory loss.      Past Medical History:   Diagnosis Date    GERD (gastroesophageal reflux disease)     Irritable bowel syndrome       Past Surgical History:   Procedure Laterality Date     SECTION      HYSTERECTOMY      STRABISMUS SURGERY      TONSILLECTOMY       Social History     Socioeconomic History    Marital status: Single     Spouse name: Not on file    Number of children: Not on file    Years of education: Not on file    Highest education level: Not on file   Occupational History    Not on file   Tobacco Use    Smoking status: Never    Smokeless tobacco: Never   Vaping Use    Vaping Use: Never used   Substance and Sexual Activity    Alcohol use: Not Currently    Drug use: Never    Sexual activity: Not on file   Other Topics Concern    Not on file   Social History Narrative    Not on file     Social Determinants of Health     Financial Resource Strain: Not on file   Food Insecurity: Not on file   Transportation Needs: Not on file   Physical Activity: Not on file   Stress: Not on file   Social Connections: Not on file   Intimate Partner Violence: Not on file   Housing Stability: Not on file     Family History   Problem Relation Age of Onset    No Known Problems Mother     No Known Problems Father     No Known Problems Sister     No Known Problems Brother     No Known Problems Maternal Grandmother     No Known Problems Maternal Grandfather     No Known Problems Paternal Grandmother     No Known Problems Paternal Grandfather     No Known Problems Maternal Aunt     No Known Problems Maternal Uncle     No Known Problems Paternal Aunt     No Known Problems Paternal Uncle     No Known Problems Cousin      Pollen extract  Current Outpatient Medications   Medication Sig Dispense Refill    famotidine (PEPCID) 40 MG tablet Take 40 mg by mouth daily      rifaximin (XIFAXAN) 550 mg tablet Take 1 tablet (550 mg total) by mouth every 8 (eight) hours for 14 days 42 tablet 0    rosuvastatin (CRESTOR) 5 mg tablet       sertraline (ZOLOFT) 50 mg tablet       Viberzi 100 MG TABS       Vitamin D, Ergocalciferol, 02814 units CAPS        No current facility-administered medications for this visit. Blood pressure 126/84, pulse 80, height 5' 4" (1.626 m), weight 70.9 kg (156 lb 6.4 oz). PHYSICAL EXAM:     General Appearance:   Alert, cooperative, no distress, appears stated age    HEENT:   Normocephalic, atraumatic, anicteric.      Neck:  Supple, symmetrical, trachea midline, no adenopathy;    thyroid: no enlargement/tenderness/nodules; no carotid  bruit or JVD    Lungs:   Clear to auscultation bilaterally; no rales, rhonchi or wheezing; respirations unlabored    Heart[de-identified]   S1 and S2 normal; regular rate and rhythm; no murmur, rub, or gallop.    Abdomen:   Soft, non-tender, non-distended; normal bowel sounds; no masses, no organomegaly    Genitalia:   Deferred    Rectal:   Deferred    Extremities:  No cyanosis, clubbing or edema    Pulses:  2+ and symmetric all extremities    Skin:  Skin color, texture, turgor normal, no rashes or lesions    Lymph nodes:  No palpable cervical, axillary or inguinal lymphadenopathy        No results found for: "WBC", "HGB", "HCT", "MCV", "PLT"  No results found for: "GLUCOSE", "CALCIUM", "NA", "K", "CO2", "CL", "BUN", "CREATININE"  No results found for: "ALT", "AST", "GGT", "ALKPHOS", "BILITOT"  No results found for: "INR", "PROTIME"        Answers submitted by the patient for this visit:  Abdominal Pain Questionnaire (Submitted on 10/30/2023)  Chief Complaint: Abdominal pain  Chronicity: chronic  Onset: more than 1 year ago  Onset quality: sudden  Frequency: every several days  Episode duration: 2 Hours  Progression since onset: unchanged  Pain location: suprapubic region  Pain - numeric: 10/10  Pain quality: cramping, sharp  Radiates to: back  anorexia: Yes  arthralgias: Yes  belching: Yes  constipation: Yes  diarrhea: Yes  flatus: Yes  hematochezia: Yes  nausea: Yes  weight loss: Yes  Relieved by: bowel movements, passing flatus, recumbency  Diagnostic workup: upper endoscopy

## 2023-10-31 NOTE — TELEPHONE ENCOUNTER
PER PLAN PT HAS ACCESS TO MEDICATION AND A PA IS NOT REQUIRED      CARLO CUMMINGS (Key: [id number])  Xifaxan 550MG tablets     Form  [facility] Electronic PA Form (2017 NCP[employer])  Created  4 minutes ago  Sent to Plan  less than a minute ago  Plan Response  less than a minute ago  Submit Clinical Questions  Determination  Message from Plan  Prior Authorization not required for patient/medication

## 2023-10-31 NOTE — TELEPHONE ENCOUNTER
----- Message from Juan Daniel Ma III, MD sent at 10/31/2023  2:18 PM EDT -----  Pls get her xifaxan approved

## 2023-11-28 ENCOUNTER — TELEPHONE (OUTPATIENT)
Dept: GASTROENTEROLOGY | Facility: CLINIC | Age: 63
End: 2023-11-28

## 2023-11-28 NOTE — TELEPHONE ENCOUNTER
Received patients labs for yeni protectin fecal & Pancreatic elastase. . will scan into patients desk & put on providers desk. Elizabeth Covarrubias

## 2024-01-09 ENCOUNTER — TELEPHONE (OUTPATIENT)
Age: 64
End: 2024-01-09

## 2024-01-09 DIAGNOSIS — R19.5 LOW FECAL ELASTASE LEVEL: Primary | ICD-10-CM

## 2024-01-09 NOTE — TELEPHONE ENCOUNTER
Patients GI provider:  Dr. 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 I've reschedulled her to march 14th and added her to a wait list. However,  she noticed her blood work was off on her last labs and she is wondering what her next steps should be? If this is something more serious and she needs to have more test or take a day off to come into the office before that appt? She would like to be advised    Scheduled procedure/appointment date if applicable: 03.14.24

## 2024-01-09 NOTE — TELEPHONE ENCOUNTER
Spoke with patient. Went over labs and particularly the abnormal result of the low fecal elastase. We will trial creon 24K U TID with meals over the next 2 months and see how she responds. Discussed that there can be false positive results but we will see if this helps her. Consider repeat fecal elastase at next follow up visit.

## 2024-02-01 RX ORDER — SERTRALINE HYDROCHLORIDE 100 MG/1
100 TABLET, FILM COATED ORAL DAILY
COMMUNITY
Start: 2023-12-05

## 2024-02-01 RX ORDER — CEFUROXIME AXETIL 500 MG/1
TABLET ORAL
COMMUNITY
Start: 2023-12-28

## 2024-02-01 RX ORDER — PREDNISONE 20 MG/1
TABLET ORAL
COMMUNITY
Start: 2023-12-28

## 2024-02-06 ENCOUNTER — OFFICE VISIT (OUTPATIENT)
Dept: GASTROENTEROLOGY | Facility: CLINIC | Age: 64
End: 2024-02-06
Payer: COMMERCIAL

## 2024-02-06 VITALS
DIASTOLIC BLOOD PRESSURE: 66 MMHG | HEIGHT: 64 IN | OXYGEN SATURATION: 98 % | WEIGHT: 161.4 LBS | BODY MASS INDEX: 27.55 KG/M2 | HEART RATE: 87 BPM | SYSTOLIC BLOOD PRESSURE: 122 MMHG

## 2024-02-06 DIAGNOSIS — R15.2 INCONTINENCE OF FECES WITH FECAL URGENCY: ICD-10-CM

## 2024-02-06 DIAGNOSIS — R14.0 BLOATING: ICD-10-CM

## 2024-02-06 DIAGNOSIS — K58.0 IRRITABLE BOWEL SYNDROME WITH DIARRHEA: ICD-10-CM

## 2024-02-06 DIAGNOSIS — R19.7 DIARRHEA, UNSPECIFIED TYPE: ICD-10-CM

## 2024-02-06 DIAGNOSIS — R19.5 LOW FECAL ELASTASE LEVEL: Primary | ICD-10-CM

## 2024-02-06 DIAGNOSIS — R15.9 INCONTINENCE OF FECES WITH FECAL URGENCY: ICD-10-CM

## 2024-02-06 PROCEDURE — 99214 OFFICE O/P EST MOD 30 MIN: CPT | Performed by: PHYSICIAN ASSISTANT

## 2024-02-06 RX ORDER — MONTELUKAST SODIUM 4 MG/1
1 TABLET, CHEWABLE ORAL 2 TIMES DAILY
Qty: 60 TABLET | Refills: 2 | Status: SHIPPED | OUTPATIENT
Start: 2024-02-06 | End: 2024-03-07

## 2024-02-06 RX ORDER — ELUXADOLINE 100 MG/1
100 TABLET, FILM COATED ORAL DAILY
Qty: 30 TABLET | Refills: 3 | Status: SHIPPED | OUTPATIENT
Start: 2024-02-06

## 2024-02-06 NOTE — PROGRESS NOTES
[facility]  Francia Lin 63 y.o. female MRN: [id number]       CC: Follow-up    HPI: Francia is a 63 year old female with history of IBS-D and symptoms for most of her life. She is on Viberzi chronically as established by her previous gastroenterologist in [place]. Patient reports Viberzi once daily has helped her diarrhea symptoms at least 50%, but she limits her diet. There are days where she may not have a bowel movement on Viberzi. She is fearful of eating meats, onions, garlic, and even tried low FODMAP. She works as a . She was seen by Dr. Ma for second opinion. Fecal calprotectin WNL, fecal elastase 117. CRP and TSH within normal limits. She was given Creon to try, however she reports she had increased diarrhea when she tried taking initial doses. She understands that it is meant to be taken for a longer period to evaluate response, but is reluctant to do so again because of her work schedule. She reports over the last year, she has had intermittent nocturnal diarrhea episodes. She has occasional fecal incontinence. Had 2 pregnancies in the past, one C section and the other vaginal.    Her last colonoscopy was in [place] in 2017.  She was noted to have severe diverticulosis.  Does not appear of random colon biopsy was taken during that exam, but was taken in 2013 according to her previous GI provider's note.  Her last EGD was in 2022, and small bowel biopsy was negative for changes to suggest celiac disease.    Review of Systems:    CONSTITUTIONAL: Denies any fever, chills, or rigors. Good appetite, and no recent weight loss.  HEENT: No earache or tinnitus. Denies hearing loss or visual disturbances.  CARDIOVASCULAR: No chest pain or palpitations.   RESPIRATORY: Denies any cough, hemoptysis, shortness of breath or dyspnea on exertion.  GASTROINTESTINAL: As noted in the History of Present Illness.   GENITOURINARY: No problems with urination. Denies any hematuria or  dysuria.  NEUROLOGIC: No dizziness or vertigo, denies headaches.   MUSCULOSKELETAL: Denies any muscle or joint pain.   SKIN: Denies skin rashes or itching.   ENDOCRINE: Denies excessive thirst. Denies intolerance to heat or cold.  PSYCHOSOCIAL: Denies depression or anxiety. Denies any recent memory loss.       Current Outpatient Medications   Medication Sig Dispense Refill    cefuroxime (CEFTIN) 500 mg tablet       colestipol (COLESTID) 1 g tablet Take 1 tablet (1 g total) by mouth 2 (two) times a day 60 tablet 2    famotidine (PEPCID) 40 MG tablet Take 40 mg by mouth daily      pancrelipase, Lip-Prot-Amyl, (CREON) 24,000 units Take 24,000 units of lipase by mouth 3 (three) times a day with meals 90 capsule 2    predniSONE 20 mg tablet       rosuvastatin (CRESTOR) 5 mg tablet       sertraline (ZOLOFT) 100 mg tablet Take 100 mg by mouth daily      sertraline (ZOLOFT) 50 mg tablet       Viberzi 100 MG TABS       Vitamin D, Ergocalciferol, 35169 units CAPS        No current facility-administered medications for this visit.     Past Medical History:   Diagnosis Date    GERD (gastroesophageal reflux disease)     Irritable bowel syndrome      Past Surgical History:   Procedure Laterality Date     SECTION      HYSTERECTOMY      STRABISMUS SURGERY      TONSILLECTOMY       Social History     Socioeconomic History    Marital status: Single     Spouse name: None    Number of children: None    Years of education: None    Highest education level: None   Occupational History    None   Tobacco Use    Smoking status: Never    Smokeless tobacco: Never   Vaping Use    Vaping status: Never Used   Substance and Sexual Activity    Alcohol use: Not Currently    Drug use: Never    Sexual activity: None   Other Topics Concern    None   Social History Narrative    None     Social Determinants of Health     Financial Resource Strain: Not on file   Food Insecurity: Not on file   Transportation Needs: Not on file   Physical Activity:  "Not on file   Stress: Not on file   Social Connections: Not on file   Intimate Partner Violence: Not on file   Housing Stability: Not on file     Family History   Problem Relation Age of Onset    No Known Problems Mother     No Known Problems Father     No Known Problems Sister     No Known Problems Brother     No Known Problems Maternal Grandmother     No Known Problems Maternal Grandfather     No Known Problems Paternal Grandmother     No Known Problems Paternal Grandfather     No Known Problems Maternal Aunt     No Known Problems Maternal Uncle     No Known Problems Paternal Aunt     No Known Problems Paternal Uncle     No Known Problems Cousin             PHYSICAL EXAM:    Vitals:    02/06/24 1551   BP: 122/66   Pulse: 87   SpO2: 98%   Weight: 73.2 kg (161 lb 6.4 oz)   Height: 5' 4\" (1.626 m)     General Appearance:   Alert and oriented x 3. Cooperative, and in no respiratory distress   HEENT:   Normocephalic, atraumatic, anicteric.     Neck:  Supple, symmetrical, trachea midline   Lungs:   Clear to auscultation bilaterally    Heart::   Regular rate and rhythm   Abdomen:   Soft, non-tender, non-distended; normal bowel sounds; no masses, no organomegaly    Genitalia:   Deferred    Rectal:   Deferred    Extremities:  No cyanosis, clubbing or edema    Pulses:  2+ and symmetric all extremities    Skin:  Skin color, texture, turgor normal, no rashes or lesions    Lymph nodes:  No palpable cervical or supraclavicular lymphadenopathy        Lab Results:             Invalid input(s): \"LABALBU\"            Imaging Studies:   Patient was never admitted.    ASSESSMENT and PLAN:      1) IBS-D - Previously diagnosed x 30 years. She had a random colon biopsy in 2013 that was negative for microscopic colitis. She reports nocturnal episodes of diarrhea only began within the last year. Previous celiac disease antibody panel negative. Low suspicion for IBD as her fecal calprotectin and CRP are WNL. Her fecal elastase could be " "falsely low due to loose stool.  - Will start patient on colestipol 1 tablet twice daily with meals, instructed the patient to skip dosage for 24 hours if constipation occurs  - As previously recommended by Dr. 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  - She agrees with plan       Follow up in 8 weeks.      Portions of the record may have been created with voice recognition software.  Occasional wrong word or \"sound a like\" substitutions may have occurred due to the inherent limitations of voice recognition software.  Read the chart carefully and recognize, using context, where substitutions have occurred.  "

## 2024-03-04 ENCOUNTER — HOSPITAL ENCOUNTER (OUTPATIENT)
Dept: RADIOLOGY | Facility: HOSPITAL | Age: 64
Discharge: HOME | End: 2024-03-04
Payer: COMMERCIAL

## 2024-03-04 DIAGNOSIS — R19.5 LOW FECAL ELASTASE LEVEL: ICD-10-CM

## 2024-03-04 DIAGNOSIS — R14.0 BLOATING: ICD-10-CM

## 2024-03-04 DIAGNOSIS — R19.7 DIARRHEA, UNSPECIFIED TYPE: ICD-10-CM

## 2024-03-04 PROCEDURE — G1004 CDSM NDSC: HCPCS

## 2024-03-04 PROCEDURE — 74183 MRI ABD W/O CNTR FLWD CNTR: CPT

## 2024-03-04 PROCEDURE — A9585 GADOBUTROL INJECTION: HCPCS | Performed by: PHYSICIAN ASSISTANT

## 2024-03-04 RX ORDER — GADOBUTROL 604.72 MG/ML
7 INJECTION INTRAVENOUS
Status: COMPLETED | OUTPATIENT
Start: 2024-03-04 | End: 2024-03-04

## 2024-03-04 RX ADMIN — GADOBUTROL 7 ML: 604.72 INJECTION INTRAVENOUS at 16:47

## 2024-03-04 NOTE — LETTER
[facility]  [address] [place] [address]      March 15, 2024    MRN: [id number]     Phone: [phone]     Dear Ms. Riley,    You recently had a(n) MRI performed on 3/4/2024 at  [facility] that was requested by Margo Patel PA-C. The study was reviewed by a radiologist, which is a physician who specializes in medical imaging. The radiologist issued a report describing his or her findings. In that report there was a finding that the radiologist felt warranted further discussion with your health care provider and that discussion would be beneficial to you.      The results were sent to Margo Patel PA-C on 03/11/2024 10:18 AM. We recommend that you contact Margo Patel PA-C at [phone] or set up an appointment to discuss the results of the imaging test. If you have already heard from Margo Patel PA-C regarding the results of your study, you can disregard this letter.     This letter is not meant to alarm you, but intended to encourage you to follow-up on your results with the provider that sent you for the imaging study. In addition, we have enclosed answers to frequently asked questions by other patients who have also received a letter to review results with their health care provider (see page two).      Thank you for choosing [facility] for your medical imaging needs.                                                                                                                                                        FREQUENTLY ASKED QUESTIONS    Why am I receiving this letter?  [place] State Law requires us to notify patients who have findings on imaging exams that may require more testing or follow-up with a health professional within the next 3 months.        How serious is the finding on the imaging test?  This letter is sent to all patients who may need follow-up or more testing within the next  3 months.  Receiving this letter does not necessarily mean you have a life-threatening imaging finding or disease.  Recommendations in the radiologist’s imaging report are general in nature and it is up to your healthcare provider to say whether those recommendations make sense for your situation.  You are strongly encouraged to talk to your health care provider about the results and ask whether additional steps need to be taken.    Where can I get a copy of the final report for my recent radiology exam?  To get a full copy of the report you can access your records online at [email or web address] or please contact [facility]’s Medical Records Department at [phone] Monday through Friday between 8 am and 6 pm.         What do I need to do now?           Please contact your health care provider who requested the imaging study to discuss what further actions (if any) are needed.  You may have already heard from (your ordering provider) in regard to this test in which case you can disregard this letter.        NOTICE IN ACCORDANCE WITH THE [place] STATE “PATIENT TEST RESULT INFORMATION ACT OF 2018”    You are receiving this notice as a result of a determination by your diagnostic imaging service that further discussions of your test results are warranted and would be beneficial to you.    The complete results of your test or tests have been or will be sent to the health care practitioner that ordered the test or tests. It is recommended that you contact your health care practitioner to discuss your results as soon as possible.

## 2024-03-12 ENCOUNTER — TELEPHONE (OUTPATIENT)
Dept: GASTROENTEROLOGY | Facility: CLINIC | Age: 64
End: 2024-03-12

## 2024-03-12 DIAGNOSIS — K86.2 PANCREATIC CYST: Primary | ICD-10-CM

## 2024-03-12 NOTE — TELEPHONE ENCOUNTER
Called and relayed MRI result to patient. Pt wanted to know if she needs to continue taking Clestipol. She reports having diarrhea and some constipation.  She plans to send provider a message via [employer].

## 2024-03-12 NOTE — TELEPHONE ENCOUNTER
----- Message from Margo Patel PA-C sent at 3/12/2024  8:49 AM EDT -----  Please let patient know that she has very small cyst on her pancreas that require a follow-up in 1 year with another MRI.  Otherwise, there is no evidence of scarring in her pancreas which is good news.  The liver appeared fatty.  Also, there is something called an adrenal adenoma seen.  This is on top of her kidney.  Does not look like cancer, but needs to be watched as well, we will make sure that her PCP has a copy of this report.  Thank you.

## 2024-03-25 ENCOUNTER — EVALUATION (OUTPATIENT)
Dept: PHYSICAL THERAPY | Facility: CLINIC | Age: 64
End: 2024-03-25
Payer: COMMERCIAL

## 2024-03-25 DIAGNOSIS — R15.9 INCONTINENCE OF FECES WITH FECAL URGENCY: Primary | ICD-10-CM

## 2024-03-25 DIAGNOSIS — R15.2 INCONTINENCE OF FECES WITH FECAL URGENCY: Primary | ICD-10-CM

## 2024-03-25 DIAGNOSIS — R32 URINARY INCONTINENCE, UNSPECIFIED TYPE: ICD-10-CM

## 2024-03-25 DIAGNOSIS — N81.89 PELVIC FLOOR WEAKNESS IN FEMALE: ICD-10-CM

## 2024-03-25 DIAGNOSIS — Z87.448 HISTORY OF CYSTOCELE: ICD-10-CM

## 2024-03-25 PROCEDURE — 97530 THERAPEUTIC ACTIVITIES: CPT

## 2024-03-25 PROCEDURE — 97162 PT EVAL MOD COMPLEX 30 MIN: CPT

## 2024-03-25 NOTE — PROGRESS NOTES
PT Evaluation     Today's date: 3/25/2024  Patient name: Francia Lin  : 1960  MRN: [id number]  Referring provider: Margo Patel PA-C  Dx:   Encounter Diagnosis     ICD-10-CM    1. Incontinence of feces with fecal urgency  R15.9 Ambulatory Referral to Physical Therapy    R15.2       2. Urinary incontinence, unspecified type  R32       3. History of cystocele  Z87.448       4. Pelvic floor weakness in female  N81.89                      Assessment  Assessment details: Francia Lin is a 63 y.o. female who presents with abdominal pain, fecal and urinary incontinence, grade one cystocele with bearing down weak PFM. Due to these impairments, patient has limitations with ADL's, recreational activities, work-related activities, lifting/carrying. Patient's clinical presentation is consistent with their referring diagnosis of Incontinence of feces with fecal urgency, as well as Urinary incontinence, unspecified type, Pelvic floor weakness, and history of cystocele.   . POC was discussed and agreed upon with patient.  Patient was educated on: pelvic floor anatomy, Importance of body mechanics and ergonomics in regards to protecting against activities which increase IAP and pressure,  and PT exam and course of treatment.  Patient vocalized a good understanding of  POC and HEP issued. Patient would benefit from skilled physical therapy services to address their aforementioned functional limitations and progress towards prior level of function and independence with home exercise program.      Pelvic floor verbal consent and written consent signed and in chart  Patient deferred second person in room: YES      Impairments: abnormal muscle firing, abnormal muscle tone, activity intolerance, impaired physical strength and lacks appropriate home exercise program    Goals  Pelvic floor short term goals: 8 weeks  -Patient will demo >/= 1/2 MMT Grade improvement in Pelvic floor activation to decrease instances of  incontinence   - Patient will report 50% reduction in incontinence frequency   - Patient will demo 2-3 second increase in PFM endurance   - Patient presents with good understanding of pelvic floor protective strategies to reduce intra-abdominal pressure against pelvic organs.  - Patient will be compliant with introductory HEP as prescribed.      Pelvic floor long term goals: 12 weeks   - Patient will demo grade of >/=4 in PFM strength to decrease incontinence symptoms and improved   - Patient will use pelvic floor muscles correctly during functional ADLs such as coughing, sneezing, lifting and exercise activities to avoid excessive IAP and PFM strain.   - Demonstrates correct isolation and relaxation of pelvic floor to palpation without overflow from global stabilizers.  - Patient will be compliant with comprehensive home exercise program for self management of condition.                 Plan  Plan details: HEP development, stretching, strengthening, A/AA/PROM, joint mobilizations, posture education, STM/MI as needed to reduce muscle tension, muscle reeducation, PLOC discussed and agreed upon with patient.    Patient would benefit from: women's health eval and skilled physical therapy  Planned modality interventions: biofeedback, cryotherapy, thermotherapy: hydrocollator packs, TENS, hydrotherapy and ultrasound  Planned therapy interventions: activity modification, abdominal trunk stabilization, manual therapy, breathing training, neuromuscular re-education, patient education, postural training, strengthening, stretching, therapeutic exercise, therapeutic activities, graded exercise, graded activity, flexibility, home exercise program, body mechanics training, behavior modification and balance/weight bearing training  Frequency: 2x week  Plan of Care beginning date: 3/25/2024  Plan of Care expiration date: 6/17/2024  Treatment plan discussed with: patient      PT Pelvic Floor Subjective:   History of Present  "Illness:   Patient reports to PT for fecal incontinence. Patient has history of IBS with bouts of diarrhea for a few years. Patient has had instance of incontinence when she does not have time to get to the bathroom. Patient report this is happening more often as of recent. 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   Social Support:     Lives in:  One-story house    Lives with:  Alone    Work status: employed full time    Life stress level: 9  Diet and Exercise:      water aerobics and walking in summer    Not exercising due to: bladder incontinence  OB/ gyn History    Gestational History:     Prior Pregnancy: Yes      Number of prior pregnancies: 2    Delivery Type: vaginal delivery and  section      Number of caesarian sections: 1    Number of vaginal deliveries after caesarian: 1    Delivery Complications:  Had tearing with second birth     Menstrual History:      Menopause: history of hysterectomy.  Bladder Function:     Voiding Difficulties positive for: frequent urination       Voiding Difficulties comments:     Voiding frequency: every 1-2 hours    Urinary leakage: urine leakage    Urinary leakage aggravated by: unknown    Nocturia (episodes per night): 2    Fluid Intake Type:  Tea and water    Intake (ounces): Water: 16, Tea: 16,   Bowel Function:     Voiding DIfficulties: urgency, stool frequency abnormal and painful defecating      Bowel frequency: daily    [place] Stool Scale: type 4 and type 7    Stool softener use: no stool softeners    Enema use: no enema    Uses \"squatty potty\": no Squatty Potty  Sexual Function:     Sexually Active:  Not sexually active  Pain:     At worst pain rating:  10    Location:  Abdominal pain    Onset:  More than 2 years ago    Aggravating factors:  Bowel movements    Alleviating factors: emptying bowels.goes away once she has had multiple bowel movements:  Diagnostic Tests:     MRI studies: abnormal      Colonoscopy: normal (in " 2017)  Patient Goals:     Patient goals for therapy:  Decreased difficulties at school and home, decreased pain, fully empty bladder or bowels, improved bladder or bowel function, improved comfort, improved quality of life, improved sleep, relaxation and return to work      Objective       Abdominal Assessment:          General Perineum Exam:   Positive for hemorrhoids and no pelvic organ prolapse at rest.     Visual Inspection of Perineum:   Excursion of perineal body in cephalad direction with contraction of pelvic floor muscles (PFM): weak  Excursion of perineal body in caudal direction with relaxation of pelvic floor muscles (PFM): unable  Involuntary contraction with coughing: no  Involuntary relaxation with bearing down: no  [place]ton swab test: non-tender  Cough reflex: no cough reflex  Sphincter Tone Resting: normal  Sphincter Tone Squeeze: normal  Sensation: intact    Pelvic Organ Prolapse   Position: hook-lying  At rest: none  With bearing down: mild (>1cm from hymenal remnants)  Perineal body inspection: within normal limits        Pelvic Floor Muscle Exam:     Muscle Contraction: overflow   Breathing pattern with contraction: holding breath   Pelvic floor muscle relaxation is complete.         PERFECT Score   Power right: 2/5   Power left: 2/5         Rectal Pelvic Floor Muscle Exam  External anal sphincter: tone WNL  External anal sphincter: hemorrhoids   Internal anal sphincter: tone WNL   pelvic floor exam consent given by patient    Pelvic exam completed: both rectally and vaginally                Precautions:   Past Medical History:   Diagnosis Date    GERD (gastroesophageal reflux disease)     Irritable bowel syndrome      Past Surgical History:   Procedure Laterality Date     SECTION      HYSTERECTOMY      STRABISMUS SURGERY      TONSILLECTOMY       Outcomes measures       DATE 3/25/2024      Pelvic Floor Distress Inventory: Raw: 30     Summary: 110.43      Pelvic Girdle Questionnaire:        Urogenital Distress Inventory       Vulvar Pain Functional Questionnaire       Chronic Prostatitis Symptom Index       Patient Specific Functional Scale (PSFS):         SOC: 3/25/2024  POC Expiration: 6/17/2024  Daily Treatment Log:  Date 3/25/2024       Visit # 1       Auth         Auth exp        Manual                        There Exer                                                                HEP Created and discussed        There Activ        Pt edu  see assessment section                                                NMReed        PFM supine        PFM seated        Supine clamshell w/ PFM         Supine hip add w/ PFM         Bridge w/ PFM         TA activation supine         Heel taps         B/L shld ext w/ TA                                 Modalities                                HEP:   Access Code: [email or web address]  URL: [email or web address]  Date: 03/25/2024  Prepared by: Nancy Kline    Exercises  - Supine Pelvic Floor Contraction  - 2 x daily - 7 x weekly - 2 sets - 10 reps  - Seated Pelvic Floor Contraction  - 1 x daily - 7 x weekly - 2 sets - 10 reps

## 2024-04-01 ENCOUNTER — TELEPHONE (OUTPATIENT)
Dept: PHYSICAL THERAPY | Facility: CLINIC | Age: 64
End: 2024-04-01

## 2024-04-01 ENCOUNTER — APPOINTMENT (OUTPATIENT)
Dept: PHYSICAL THERAPY | Facility: CLINIC | Age: 64
End: 2024-04-01
Payer: COMMERCIAL

## 2024-04-01 RX ORDER — OMEPRAZOLE 40 MG/1
CAPSULE, DELAYED RELEASE ORAL
COMMUNITY
Start: 2024-03-13

## 2024-04-01 RX ORDER — ROSUVASTATIN CALCIUM 10 MG/1
TABLET, COATED ORAL
COMMUNITY
Start: 2024-03-13

## 2024-04-01 NOTE — TELEPHONE ENCOUNTER
"patient called [facility]- stating she's \"dragging\" and not feeling well today. cx'd today and confirmed next appt - did not want to reschedule.    "

## 2024-04-03 ENCOUNTER — OFFICE VISIT (OUTPATIENT)
Dept: GASTROENTEROLOGY | Facility: CLINIC | Age: 64
End: 2024-04-03
Payer: COMMERCIAL

## 2024-04-03 VITALS
OXYGEN SATURATION: 98 % | WEIGHT: 162 LBS | TEMPERATURE: 98.2 F | SYSTOLIC BLOOD PRESSURE: 122 MMHG | BODY MASS INDEX: 27.66 KG/M2 | HEART RATE: 62 BPM | HEIGHT: 64 IN | RESPIRATION RATE: 18 BRPM | DIASTOLIC BLOOD PRESSURE: 84 MMHG

## 2024-04-03 DIAGNOSIS — D49.0 IPMN (INTRADUCTAL PAPILLARY MUCINOUS NEOPLASM): ICD-10-CM

## 2024-04-03 DIAGNOSIS — K58.0 IRRITABLE BOWEL SYNDROME WITH DIARRHEA: Primary | ICD-10-CM

## 2024-04-03 PROCEDURE — 99213 OFFICE O/P EST LOW 20 MIN: CPT | Performed by: PHYSICIAN ASSISTANT

## 2024-04-03 NOTE — PROGRESS NOTES
[facility]  Francia Lin 63 y.o. female MRN: [id number]       CC: Follow-up    HPI: Francia is a 63-year-old female with history of IBS-D.  Patient is here for follow-up after I last saw her in February.  Patient is also chronically on Viberzi through her previous gastroenterologist in [place].  She has been taking colestipol once in the morning daily.  She reports that surprisingly she has had constipation.  She has not trialed taking the medication every other morning.  She did start pelvic physical therapy.  She denies rectal bleeding.  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  She feels that she will be able to experiment better with diet once she is off of school.    Due to low fecal elastase, MRI/MRCP was ordered.  No suspicious focal pancreatic neoplasm or ductal dilation.  Patient had evidence of several small pancreatic cystic lesions measuring under a centimeter in size likely IPMN.  Fatty liver noted.  No evidence of pancreatic atrophy.  Fecal calprotectin was normal.    Her last colonoscopy was in [place] in 2017. She was noted to have severe diverticulosis. Does not appear of random colon biopsy was taken during that exam, but was taken in 2013 according to her previous GI provider's note. Her last EGD was in 2022, and small bowel biopsy was negative for changes to suggest celiac disease.     Review of Systems:    CONSTITUTIONAL: Denies any fever, chills, or rigors. Good appetite, and no recent weight loss.  HEENT: No earache or tinnitus. Denies hearing loss or visual disturbances.  CARDIOVASCULAR: No chest pain or palpitations.   RESPIRATORY: Denies any cough, hemoptysis, shortness of breath or dyspnea on exertion.  GASTROINTESTINAL: As noted in the History of Present Illness.   GENITOURINARY: No problems with urination. Denies any hematuria or dysuria.  NEUROLOGIC: No dizziness or vertigo, denies headaches.   MUSCULOSKELETAL: Denies any muscle or  joint pain.   SKIN: Denies skin rashes or itching.   ENDOCRINE: Denies excessive thirst. Denies intolerance to heat or cold.  PSYCHOSOCIAL: Denies depression or anxiety. Denies any recent memory loss.       Current Outpatient Medications   Medication Sig Dispense Refill    cefuroxime (CEFTIN) 500 mg tablet       Eluxadoline (Viberzi) 100 MG TABS Take 1 tablet (100 mg total) by mouth in the morning 30 tablet 3    famotidine (PEPCID) 40 MG tablet Take 40 mg by mouth daily      omeprazole (PriLOSEC) 40 MG capsule       pancrelipase, Lip-Prot-Amyl, (CREON) 24,000 units Take 24,000 units of lipase by mouth 3 (three) times a day with meals 90 capsule 2    predniSONE 20 mg tablet       rosuvastatin (CRESTOR) 10 MG tablet       rosuvastatin (CRESTOR) 5 mg tablet       sertraline (ZOLOFT) 100 mg tablet Take 100 mg by mouth daily      Vitamin D, Ergocalciferol, 07760 units CAPS       colestipol (COLESTID) 1 g tablet Take 1 tablet (1 g total) by mouth 2 (two) times a day 60 tablet 2    sertraline (ZOLOFT) 50 mg tablet        No current facility-administered medications for this visit.     Past Medical History:   Diagnosis Date    GERD (gastroesophageal reflux disease)     Irritable bowel syndrome      Past Surgical History:   Procedure Laterality Date     SECTION      HYSTERECTOMY      STRABISMUS SURGERY      TONSILLECTOMY       Social History     Socioeconomic History    Marital status: Single     Spouse name: None    Number of children: None    Years of education: None    Highest education level: None   Occupational History    None   Tobacco Use    Smoking status: Never    Smokeless tobacco: Never   Vaping Use    Vaping status: Never Used   Substance and Sexual Activity    Alcohol use: Not Currently    Drug use: Never    Sexual activity: None   Other Topics Concern    None   Social History Narrative    None     Social Determinants of Health     Financial Resource Strain: Not on file   Food Insecurity: Not on file  "  Transportation Needs: Not on file   Physical Activity: Not on file   Stress: Not on file   Social Connections: Not on file   Intimate Partner Violence: Not on file   Housing Stability: Not on file     Family History   Problem Relation Age of Onset    No Known Problems Mother     No Known Problems Father     No Known Problems Sister     No Known Problems Brother     No Known Problems Maternal Grandmother     No Known Problems Maternal Grandfather     No Known Problems Paternal Grandmother     No Known Problems Paternal Grandfather     No Known Problems Maternal Aunt     No Known Problems Maternal Uncle     No Known Problems Paternal Aunt     No Known Problems Paternal Uncle     No Known Problems Cousin             PHYSICAL EXAM:    There were no vitals filed for this visit.  General Appearance:   Alert and oriented x 3. Cooperative, and in no respiratory distress   HEENT:   Normocephalic, atraumatic, anicteric.     Neck:  Supple, symmetrical, trachea midline   Lungs:   Clear to auscultation bilaterally    Heart::   Regular rate and rhythm   Abdomen:   Soft, non-tender, non-distended; normal bowel sounds; no masses, no organomegaly    Genitalia:   Deferred    Rectal:   Deferred    Extremities:  No cyanosis, clubbing or edema    Pulses:  2+ and symmetric all extremities    Skin:  Skin color, texture, turgor normal, no rashes or lesions    Lymph nodes:  No palpable cervical or supraclavicular lymphadenopathy        Lab Results:             Invalid input(s): \"LABALBU\"            Imaging Studies:   MRI abdomen w wo contrast and mrcp    Result Date: 3/11/2024  Impression: No suspicious focal pancreatic neoplasm or abnormal main pancreatic ductal dilation. A few small pancreatic cystic lesions measuring up to 0.3 cm without internal worrisome features, likely representing IPMN. For simple cyst(s) less than 1.5 cm, recommend yearly follow-up 5 times, then every 2 years for 2 times. If cyst(s) stable after 9 years, no " "further follow-ups. Recommend next follow-up in 1 year. Preferred imaging modality: abdomen MRI and MRCP with and without IV contrast, or triple phase  abdomen CT with IV contrast, or abdomen MRI and MRCP without IV contrast. Steatosis A 1.4 cm right adrenal adenoma The study was marked in EPIC for significant notification. Workstation performed: [id number]       ASSESSMENT and PLAN:      1) IBS-D - Patient's symptoms are stable.  She admits that she is still eating a rather restrictive diet just because she is still working as a teacher.  The last day of school is on June 14.  - Continue pelvic floor PT  - Colestipol every other morning  - Continue Viberzi as previously recommended by Dr. Ma  - If patient develops alarm symptoms, would recommend a colonoscopy and obscure testing for diarrhea    2) Sub-centimeter pancreatic cyst, likely IPMN per report - MRI in 1 to 2 years.  Provided order to the patient.  There is no family history of pancreatic cancer to her knowledge.  No signs of pancreatic atrophy.  Follow-up with PCP regarding adrenal adenoma finding.      Follow up in 8-12 weeks.      Portions of the record may have been created with voice recognition software.  Occasional wrong word or \"sound a like\" substitutions may have occurred due to the inherent limitations of voice recognition software.  Read the chart carefully and recognize, using context, where substitutions have occurred.  "

## 2024-04-08 ENCOUNTER — OFFICE VISIT (OUTPATIENT)
Dept: PHYSICAL THERAPY | Facility: CLINIC | Age: 64
End: 2024-04-08
Payer: COMMERCIAL

## 2024-04-08 DIAGNOSIS — R15.2 INCONTINENCE OF FECES WITH FECAL URGENCY: Primary | ICD-10-CM

## 2024-04-08 DIAGNOSIS — R32 URINARY INCONTINENCE, UNSPECIFIED TYPE: ICD-10-CM

## 2024-04-08 DIAGNOSIS — N81.89 PELVIC FLOOR WEAKNESS IN FEMALE: ICD-10-CM

## 2024-04-08 DIAGNOSIS — Z87.448 HISTORY OF CYSTOCELE: ICD-10-CM

## 2024-04-08 DIAGNOSIS — R15.9 INCONTINENCE OF FECES WITH FECAL URGENCY: Primary | ICD-10-CM

## 2024-04-08 PROCEDURE — 97112 NEUROMUSCULAR REEDUCATION: CPT

## 2024-04-08 NOTE — PROGRESS NOTES
Daily Note     Today's date: 2024  Patient name: Francia Lin  : 1960  MRN: [id number]  Referring provider: Margo Patel PA-C  Dx:   Encounter Diagnosis     ICD-10-CM    1. Incontinence of feces with fecal urgency  R15.9     R15.2       2. Urinary incontinence, unspecified type  R32       3. History of cystocele  Z87.448       4. Pelvic floor weakness in female  N81.89                      Subjective: Patient reports no concerns with HEP at this time. Patient had constipation one week and looser stool the next. Patient feels this is based on changes to medication and her and MD havent found the right combination just yet.        Objective: See treatment diary below      Assessment: Tolerated treatment well with PFM strengthening performed. Patient noted some difficulty coordinating add squeeze with kegel. Patient edu on abdominal massage to use when constipated however was edu this would not be necessary during instances of softer or looser stool. Patient would benefit from continued PT      Plan: Continue per plan of care.      Precautions:   Past Medical History:   Diagnosis Date    GERD (gastroesophageal reflux disease)     Irritable bowel syndrome      Past Surgical History:   Procedure Laterality Date     SECTION      HYSTERECTOMY      STRABISMUS SURGERY      TONSILLECTOMY       Outcomes measures       DATE 3/25/2024      Pelvic Floor Distress Inventory: Raw: 30     Summary: 110.43      Pelvic Girdle Questionnaire:       Urogenital Distress Inventory       Vulvar Pain Functional Questionnaire       Chronic Prostatitis Symptom Index       Patient Specific Functional Scale (PSFS):         SOC: 3/25/2024  POC Expiration: 2024  Daily Treatment Log:  Date 3/25/2024 2024      Visit # 1 2      Auth         Auth exp        Manual                        There Exer                                                                HEP Created and discussed  Updated and discussed with  "additional edu on adominal massage when constipated.       There Activ        Pt edu  see assessment section                                                NMReed  38'      Quick flicks   1x10       PFM seated  1x10       Supine clamshell w/ PFM   RTB 1x10       Supine hip add w/ PFM   5\" x10       Bridge w/ PFM   1x10 (low height due to glute weakness)       TA activation supine   3\" x10       Heel taps   1x10 R/L       B/L shld ext w/ TA         PFM with lifting   Table height to shoulder height 1x5                      Modalities                                HEP:   Access Code: [email or web address]  URL: [email or web address]  Date: 03/25/2024  Prepared by: Nancy Kline    Exercises  - Supine Pelvic Floor Contraction  - 2 x daily - 7 x weekly - 2 sets - 10 reps  - Seated Pelvic Floor Contraction  - 1 x daily - 7 x weekly - 2 sets - 10 reps       "

## 2024-04-15 ENCOUNTER — APPOINTMENT (OUTPATIENT)
Dept: PHYSICAL THERAPY | Facility: CLINIC | Age: 64
End: 2024-04-15
Payer: COMMERCIAL

## 2024-04-15 ENCOUNTER — TELEPHONE (OUTPATIENT)
Dept: PHYSICAL THERAPY | Facility: CLINIC | Age: 64
End: 2024-04-15

## 2024-04-15 NOTE — TELEPHONE ENCOUNTER
Patient called [facility] stating she is having a flare up of IBS - cannot leave the house. devi'd tonight - confirmed next week.    Nancy Kline PT, DPT   License #  [id number]

## 2024-04-22 ENCOUNTER — OFFICE VISIT (OUTPATIENT)
Dept: PHYSICAL THERAPY | Facility: CLINIC | Age: 64
End: 2024-04-22
Payer: COMMERCIAL

## 2024-04-22 DIAGNOSIS — Z87.448 HISTORY OF CYSTOCELE: ICD-10-CM

## 2024-04-22 DIAGNOSIS — R15.9 INCONTINENCE OF FECES WITH FECAL URGENCY: Primary | ICD-10-CM

## 2024-04-22 DIAGNOSIS — R32 URINARY INCONTINENCE, UNSPECIFIED TYPE: ICD-10-CM

## 2024-04-22 DIAGNOSIS — N81.89 PELVIC FLOOR WEAKNESS IN FEMALE: ICD-10-CM

## 2024-04-22 DIAGNOSIS — R15.2 INCONTINENCE OF FECES WITH FECAL URGENCY: Primary | ICD-10-CM

## 2024-04-22 PROCEDURE — 97112 NEUROMUSCULAR REEDUCATION: CPT

## 2024-04-22 NOTE — PROGRESS NOTES
Daily Note     Today's date: 2024  Patient name: Francia Lin  : 1960  MRN: [id number]  Referring provider: Margo Patel PA-C  Dx:   Encounter Diagnosis     ICD-10-CM    1. Incontinence of feces with fecal urgency  R15.9     R15.2       2. Urinary incontinence, unspecified type  R32       3. History of cystocele  Z87.448       4. Pelvic floor weakness in female  N81.89                      Subjective: Patient reports that she had one day last week with increase fecal incontinence/ urgency that she thinks may be from the fruit salad she ate that day but is unsure. Patient reports she has noted some days she can eat certain foods and some days she cannot and symptoms do vary a lot. Patient reports she has not been compliant with HEP every day due to busy schedule.       Objective: See treatment diary below      Assessment: Tolerated treatment well with PFM strengthening performed with HEP progressed to sitting and standing for more ease with compliance throughout the day. Patient would benefit from continued PT      Plan: Continue per plan of care.      Precautions:   Past Medical History:   Diagnosis Date    GERD (gastroesophageal reflux disease)     Irritable bowel syndrome      Past Surgical History:   Procedure Laterality Date     SECTION      HYSTERECTOMY      STRABISMUS SURGERY      TONSILLECTOMY       Outcomes measures       DATE 3/25/2024      Pelvic Floor Distress Inventory: Raw: 30     Summary: 110.43      Pelvic Girdle Questionnaire:       Urogenital Distress Inventory       Vulvar Pain Functional Questionnaire       Chronic Prostatitis Symptom Index       Patient Specific Functional Scale (PSFS):         SOC: 3/25/2024  POC Expiration: 2024  Daily Treatment Log:  Date 3/25/2024 2024 2024     Visit # 1 2 3     Auth         Auth exp        Manual                        There Exer                                                                HEP Created and  "discussed  Updated and discussed with additional edu on adominal massage when constipated.  Updated and discussed     There Activ        Pt edu  see assessment section                                                NMReed  38' 38'     Quick flicks   1x10       PFM seated  1x10  W/ LAQ 1x10      Supine clamshell w/ PFM   RTB 1x10  RTB 1x10      Supine hip add w/ PFM   5\" x10  5\" x10      Bridge w/ PFM   1x10 (low height due to glute weakness)  1x10 improved height      TA activation supine   3\" x10  3\" x10      Heel taps   1x10 R/L       B/L shld ext w/ TA    RTB 1x10      PFM with lifting   Table height to shoulder height 1x5      Supine hip IR/ER w/ PFM    1x10              Modalities                                HEP:   Access Code: [email or web address]  URL: [email or web address]  Date: 04/22/2024  Prepared by: Nancy Kline    Exercises  - Seated Pelvic Floor Contraction  - 2 x daily - 7 x weekly - 2 sets - 10 reps  - Standing Pelvic Floor Contraction  - 1 x daily - 7 x weekly - 2 sets - 10 reps  - Quick Flick Pelvic Floor Contractions Seated - 1 x daily - 7 x weekly - 1 sets - 10 reps     "

## 2024-04-29 ENCOUNTER — OFFICE VISIT (OUTPATIENT)
Dept: PHYSICAL THERAPY | Facility: CLINIC | Age: 64
End: 2024-04-29
Payer: COMMERCIAL

## 2024-04-29 DIAGNOSIS — R15.9 INCONTINENCE OF FECES WITH FECAL URGENCY: Primary | ICD-10-CM

## 2024-04-29 DIAGNOSIS — Z87.448 HISTORY OF CYSTOCELE: ICD-10-CM

## 2024-04-29 DIAGNOSIS — R15.2 INCONTINENCE OF FECES WITH FECAL URGENCY: Primary | ICD-10-CM

## 2024-04-29 DIAGNOSIS — R32 URINARY INCONTINENCE, UNSPECIFIED TYPE: ICD-10-CM

## 2024-04-29 DIAGNOSIS — N81.89 PELVIC FLOOR WEAKNESS IN FEMALE: ICD-10-CM

## 2024-04-29 PROCEDURE — 97112 NEUROMUSCULAR REEDUCATION: CPT

## 2024-04-29 NOTE — PROGRESS NOTES
Daily Note     Today's date: 2024  Patient name: Francia Lin  : 1960  MRN: [id number]  Referring provider: Margo Patel PA-C  Dx:   Encounter Diagnosis     ICD-10-CM    1. Incontinence of feces with fecal urgency  R15.9     R15.2       2. Urinary incontinence, unspecified type  R32       3. History of cystocele  Z87.448       4. Pelvic floor weakness in female  N81.89                      Subjective: Patient reports that she has had a lot of trouble today in terms of bowels. Patient forgot to take pills this morning and was eating a different variety of food this weekend and believe this may have impacted in. Patient reports she has not been as good with her HEP. She does do it but not always to the recommended frequency.  Patient would like to go a on a hold due to her schedule and wants to see how she does with HEP and potentially return in the future.       Objective: See treatment diary below      Assessment: Tolerated treatment well with PFM strengthening performed with HEP progressed to sitting and standing for more ease with compliance throughout the day. Patient would benefit from continued PT, however will go on 2 week hold due to schedule constraints at this time.       Plan: Continue per plan of care.      Precautions:   Past Medical History:   Diagnosis Date    GERD (gastroesophageal reflux disease)     Irritable bowel syndrome      Past Surgical History:   Procedure Laterality Date     SECTION      HYSTERECTOMY      STRABISMUS SURGERY      TONSILLECTOMY       Outcomes measures       DATE 3/25/2024 2024     Pelvic Floor Distress Inventory: Raw: 30     Summary: 110.43 Raw: 30     Summary: 110.43     Pelvic Girdle Questionnaire:       Urogenital Distress Inventory       Vulvar Pain Functional Questionnaire       Chronic Prostatitis Symptom Index       Patient Specific Functional Scale (PSFS):         SOC: 3/25/2024  POC Expiration: 2024  Daily Treatment Log:  Date  "3/25/2024 4/8/2024 4/22/2024 4/29/2024    Visit # 1 2 3 4    Auth         Auth exp        Manual                        There Exer                                                                HEP Created and discussed  Updated and discussed with additional edu on adominal massage when constipated.  Updated and discussed Discussed doing 60-80 reps of any combo of the exercises as long as she is meeting the total    There Activ        Pt edu  see assessment section                                                NMReed  38' 38' 35'    Quick flicks   1x10       PFM seated  1x10  W/ LAQ 1x10  W/ LAQ 1x10     Supine clamshell w/ PFM   RTB 1x10  RTB 1x10  Seated RTB 1x10     Supine hip add w/ PFM   5\" x10  5\" x10  Seated 5\" x10     Bridge w/ PFM   1x10 (low height due to glute weakness)  1x10 improved height      TA activation supine   3\" x10  3\" x10      Heel taps   1x10 R/L       B/L shld ext w/ TA    RTB 1x10  RTB 1x10     PFM with lifting   Table height to shoulder height 1x5      Supine hip IR/ER w/ PFM    1x10      Palloff press    RTB 1x10 R/L     STS w/ PFM     PFM stand relax then PFM and sit 1x5 standard chair                     Modalities                                HEP:   Access Code: [email or web address]  URL: [email or web address]  Date: 04/22/2024  Prepared by: Nancy Kline    Exercises  - Seated Pelvic Floor Contraction  - 2 x daily - 7 x weekly - 2 sets - 10 reps  - Standing Pelvic Floor Contraction  - 1 x daily - 7 x weekly - 2 sets - 10 reps  - Quick Flick Pelvic Floor Contractions Seated - 1 x daily - 7 x weekly - 1 sets - 10 reps     "

## 2024-07-05 ENCOUNTER — APPOINTMENT (EMERGENCY)
Dept: RADIOLOGY | Facility: HOSPITAL | Age: 64
End: 2024-07-05
Payer: COMMERCIAL

## 2024-07-05 ENCOUNTER — HOSPITAL ENCOUNTER (EMERGENCY)
Facility: HOSPITAL | Age: 64
Discharge: HOME | End: 2024-07-05
Attending: EMERGENCY MEDICINE
Payer: COMMERCIAL

## 2024-07-05 VITALS
OXYGEN SATURATION: 96 % | BODY MASS INDEX: 28.49 KG/M2 | TEMPERATURE: 97.6 F | RESPIRATION RATE: 18 BRPM | WEIGHT: 166 LBS | DIASTOLIC BLOOD PRESSURE: 68 MMHG | HEART RATE: 61 BPM | SYSTOLIC BLOOD PRESSURE: 144 MMHG

## 2024-07-05 DIAGNOSIS — S92.902A CLOSED FRACTURE OF LEFT FOOT, INITIAL ENCOUNTER: Primary | ICD-10-CM
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
## 2024-07-05 DIAGNOSIS — S22.42XA CLOSED FRACTURE OF MULTIPLE RIBS OF LEFT SIDE, INITIAL ENCOUNTER: ICD-10-CM

## 2024-07-05 PROCEDURE — 99284 EMERGENCY DEPT VISIT MOD MDM: CPT

## 2024-07-05 PROCEDURE — 71250 CT THORAX DX C-: CPT

## 2024-07-05 PROCEDURE — 73630 X-RAY EXAM OF FOOT: CPT

## 2024-07-05 PROCEDURE — 99284 EMERGENCY DEPT VISIT MOD MDM: CPT | Performed by: EMERGENCY MEDICINE

## 2024-07-05 RX ORDER — OXYCODONE HYDROCHLORIDE 5 MG/1
5 TABLET ORAL ONCE
Status: DISCONTINUED | OUTPATIENT
Start: 2024-07-05 | End: 2024-07-06 | Stop reason: HOSPADM

## 2024-07-05 RX ORDER — IBUPROFEN 600 MG/1
600 TABLET ORAL ONCE
Status: COMPLETED | OUTPATIENT
Start: 2024-07-05 | End: 2024-07-05

## 2024-07-05 RX ORDER — LIDOCAINE 50 MG/G
1 PATCH TOPICAL ONCE
Status: DISCONTINUED | OUTPATIENT
Start: 2024-07-05 | End: 2024-07-06 | Stop reason: HOSPADM

## 2024-07-05 RX ORDER — LIDOCAINE 50 MG/G
1 PATCH TOPICAL DAILY
Qty: 30 PATCH | Refills: 0 | Status: SHIPPED | OUTPATIENT
Start: 2024-07-05

## 2024-07-05 RX ORDER — HYDROCODONE BITARTRATE AND ACETAMINOPHEN 5; 325 MG/1; MG/1
1 TABLET ORAL EVERY 6 HOURS PRN
Qty: 10 TABLET | Refills: 0 | Status: SHIPPED | OUTPATIENT
Start: 2024-07-05

## 2024-07-05 RX ADMIN — LIDOCAINE 1 PATCH: 700 PATCH TOPICAL at 23:09

## 2024-07-05 RX ADMIN — IBUPROFEN 600 MG: 600 TABLET, FILM COATED ORAL at 21:47

## 2024-07-06 NOTE — ED PROVIDER NOTES
History  Chief Complaint   Patient presents with    Fall     States she was on a moving train and went to step into aisle, she forgot about the step down and fell into aisle and into arm of seat. No LOC. States pain left posterior/lateral/anterior ribs and feels cannot take a deep breath. Pain left foot with bruising lateral aspect.     Patient presents for evaluation after a fall.  She was on a moving train when she went to get stepped out of her seat into the aisle way that realize there was a step down and tripped and fell into the armrest of the seat across the aisle.  Complaining of pain in her left ribs and her left foot.  Denies any head injury or loss conscious.  Denies any neck pain.      History provided by:  Patient   used: No    Fall      Prior to Admission Medications   Prescriptions Last Dose Informant Patient Reported? Taking?   Eluxadoline (Viberzi) 100 MG TABS   No No   Sig: Take 1 tablet (100 mg total) by mouth in the morning   Vitamin D, Ergocalciferol, 02615 units CAPS  Self Yes No   cefuroxime (CEFTIN) 500 mg tablet  Self Yes No   colestipol (COLESTID) 1 g tablet   No No   Sig: Take 1 tablet (1 g total) by mouth 2 (two) times a day   famotidine (PEPCID) 40 MG tablet  Self Yes No   Sig: Take 40 mg by mouth daily   omeprazole (PriLOSEC) 40 MG capsule   Yes No   predniSONE 20 mg tablet  Self Yes No   rosuvastatin (CRESTOR) 10 MG tablet   Yes No   rosuvastatin (CRESTOR) 5 mg tablet  Self Yes No   sertraline (ZOLOFT) 100 mg tablet  Self Yes No   Sig: Take 100 mg by mouth daily   sertraline (ZOLOFT) 50 mg tablet  Self Yes No      Facility-Administered Medications: None       Past Medical History:   Diagnosis Date    GERD (gastroesophageal reflux disease)     Irritable bowel syndrome        Past Surgical History:   Procedure Laterality Date     SECTION      HYSTERECTOMY      STRABISMUS SURGERY      TONSILLECTOMY         Family History   Problem Relation Age of Onset    No  Known Problems Mother     No Known Problems Father     No Known Problems Sister     No Known Problems Brother     No Known Problems Maternal Grandmother     No Known Problems Maternal Grandfather     No Known Problems Paternal Grandmother     No Known Problems Paternal Grandfather     No Known Problems Maternal Aunt     No Known Problems Maternal Uncle     No Known Problems Paternal Aunt     No Known Problems Paternal Uncle     No Known Problems Cousin      I have reviewed and agree with the history as documented.    E-Cigarette/Vaping    E-Cigarette Use Never User      E-Cigarette/Vaping Substances     Social History     Tobacco Use    Smoking status: Never    Smokeless tobacco: Never   Vaping Use    Vaping status: Never Used   Substance Use Topics    Alcohol use: Not Currently    Drug use: Never       Review of Systems   All other systems reviewed and are negative.      Physical Exam  Physical Exam  Vitals and nursing note reviewed.   Constitutional:       General: She is not in acute distress.  HENT:      Head: Atraumatic.   Cardiovascular:      Rate and Rhythm: Normal rate and regular rhythm.      Pulses: Normal pulses.   Pulmonary:      Effort: Pulmonary effort is normal. No respiratory distress.      Breath sounds: Normal breath sounds.   Abdominal:      General: Bowel sounds are normal. There is no distension.      Palpations: Abdomen is soft.      Tenderness: There is no abdominal tenderness. There is no guarding or rebound.   Musculoskeletal:         General: Swelling and tenderness present.        Arms:       Cervical back: Normal range of motion. No tenderness.        Legs:    Skin:     Findings: Bruising present.   Neurological:      General: No focal deficit present.      Mental Status: She is alert and oriented to person, place, and time.         Vital Signs  ED Triage Vitals [07/05/24 2130]   Temperature Pulse Respirations Blood Pressure SpO2   97.6 °F (36.4 °C) 61 18 144/68 96 %      Temp Source  Heart Rate Source Patient Position - Orthostatic VS BP Location FiO2 (%)   Oral Monitor Sitting Right arm --      Pain Score       10 - Worst Possible Pain           Vitals:    07/05/24 2130   BP: 144/68   Pulse: 61   Patient Position - Orthostatic VS: Sitting         Visual Acuity  Visual Acuity      Flowsheet Row Most Recent Value   L Pupil Size (mm) 2   R Pupil Size (mm) 2            ED Medications  Medications   oxyCODONE (ROXICODONE) IR tablet 5 mg (5 mg Oral Not Given 7/5/24 2145)   lidocaine (LIDODERM) 5 % patch 1 patch (has no administration in time range)   ibuprofen (MOTRIN) tablet 600 mg (600 mg Oral Given 7/5/24 2147)       Diagnostic Studies  Results Reviewed       None                   CT chest without contrast   Final Result by Nevaeh Galicia MD (07/05 2226)      Acute minimally displaced fractures of the posterior left ninth, 10th, and 11th ribs. The posterior left ninth rib is fractured in 2 places.      No hemothorax or pneumothorax.      Mild coronary artery calcification indicating atherosclerotic heart disease.      Small hiatal hernia.         Workstation performed: [id number]         XR foot 3+ views LEFT    (Results Pending)              Procedures  Splint application    Date/Time: 7/5/2024 10:54 PM    Performed by: Norman Luevano DO  Authorized by: Norman Luevano DO  Universal Protocol:  Procedure performed by: (MATTHIAS Hernandez)  Consent: Verbal consent obtained.  Consent given by: patient  Patient identity confirmed: verbally with patient and arm band    Pre-procedure details:     Sensation:  Normal  Procedure details:     Laterality:  Left    Location:  Foot    Foot:  L footCast type:     Cast type: Walking boot.  Post-procedure details:     Pain:  Unchanged    Sensation:  Normal    Patient tolerance of procedure:  Tolerated well, no immediate complications           ED Course                               SBIRT 20yo+      Flowsheet Row Most Recent Value   Initial Alcohol Screen: US  AUDIT-C     1. How often do you have a drink containing alcohol? 0 Filed at: 07/05/2024 2154   2. How many drinks containing alcohol do you have on a typical day you are drinking?  0 Filed at: 07/05/2024 2154   3a. Male UNDER 65: How often do you have five or more drinks on one occasion? 0 Filed at: 07/05/2024 2154   3b. FEMALE Any Age, or MALE 65+: How often do you have 4 or more drinks on one occassion? 0 Filed at: 07/05/2024 2154   Audit-C Score 0 Filed at: 07/05/2024 2154   CORTNEY: How many times in the past year have you...    Used an illegal drug or used a prescription medication for non-medical reasons? Never Filed at: 07/05/2024 2154                      Medical Decision Making  Pulse ox 96% on room air indicating adequate oxygenation.  Xray L foot: Fracture of left fifth metatarsal as read by me      Patient declined any thing stronger for pain but did agree to a prescription for home.  Discussed treatment when his symptoms from her home prevent complications secondary to the rib fractures.  Also discussed follow-up for foot fracture with podiatry.    Amount and/or Complexity of Data Reviewed  Radiology: ordered and independent interpretation performed.    Risk  Prescription drug management.             Disposition  Final diagnoses:   Closed fracture of left foot, initial encounter - 5th metatarsal   Fall, initial encounter   Closed fracture of multiple ribs of left side, initial encounter - 9, 10, 11     Time reflects when diagnosis was documented in both MDM as applicable and the Disposition within this note       Time User Action Codes Description Comment    7/5/2024 10:45 PM Norman Luevano Add [S92.902A] Closed fracture of left foot, initial encounter     7/5/2024 10:45 PM Norman Luevano Modify [S92.902A] Closed fracture of left foot, initial encounter 5th metatarsal    7/5/2024 10:45 PM Norman Luevano Add [W19.XXXA] Fall, initial encounter     7/5/2024 10:46 PM Norman Luevano Add [S22.42XA] Closed  fracture of multiple ribs of left side, initial encounter     7/5/2024 10:46 PM Norman Luevano [S22.42XA] Closed fracture of multiple ribs of left side, initial encounter 9, 10, 11          ED Disposition       ED Disposition   Discharge    Condition   Stable    Date/Time   Fri Jul 5, 2024 224    Comment   Francia MARK ANTHONY Handy discharge to home/self care.                   Follow-up Information       Follow up With Specialties Details Why Contact Info Additional Information    Gerson Gandara MD Internal Medicine In 1 week  [address] [facility] [address]  [phone]       [facility] Emergency Department Emergency Medicine  If symptoms worsen [address] [place] [id number]  [phone] [facility] Emergency Department, [address] [place] [address], [id number]            Patient's Medications   Discharge Prescriptions    HYDROCODONE-ACETAMINOPHEN (NORCO) 5-325 MG PER TABLET    Take 1 tablet by mouth every 6 (six) hours as needed for pain for up to 10 doses Max Daily Amount: 4 tablets       Start Date: 7/5/2024  End Date: --       Order Dose: 1 tablet       Quantity: 10 tablet    Refills: 0    LIDOCAINE (LIDODERM) 5 %    Apply 1 patch topically over 12 hours daily Remove & Discard patch within 12 hours or as directed by MD       Start Date: 7/5/2024  End Date: --       Order Dose: 1 patch       Quantity: 30 patch    Refills: 0           PDMP Review       None            ED Provider  Electronically Signed by             Norman Luevano DO  07/05/24 7500

## 2024-07-08 ENCOUNTER — TELEPHONE (OUTPATIENT)
Age: 64
End: 2024-07-08

## 2024-07-08 DIAGNOSIS — R19.5 LOW FECAL ELASTASE LEVEL: ICD-10-CM

## 2024-07-08 DIAGNOSIS — R14.0 BLOATING: ICD-10-CM

## 2024-07-08 DIAGNOSIS — R19.7 DIARRHEA, UNSPECIFIED TYPE: ICD-10-CM

## 2024-07-08 RX ORDER — MONTELUKAST SODIUM 4 MG/1
1 TABLET, CHEWABLE ORAL 2 TIMES DAILY
Qty: 60 TABLET | Refills: 2 | Status: SHIPPED | OUTPATIENT
Start: 2024-07-08 | End: 2024-07-09 | Stop reason: SDUPTHER

## 2024-07-08 NOTE — TELEPHONE ENCOUNTER
Transmission to pharmacy error occurred for colestipol Medication resent to pharmacy and continues to fail transmission. Forwarding to clinical office to follow up with pharmacy in regards to failed transmission.

## 2024-07-08 NOTE — TELEPHONE ENCOUNTER
Spoke to patient regarding Dr. Akins's message. Advised patient to call each morning to see if there are any cancellations. Pt verbalized understanding and kept her Thursday appt.

## 2024-07-08 NOTE — TELEPHONE ENCOUNTER
Caller: Francia Lin    Doctor and/or Office: Dr. Akins/[facility]#: [phone]    Escalation: Appointment Patient was seen in the ED with closed fracture of left foot. Routine referral in Epic.  I scheduled her for Thursday. She wants to be seen sooner and asked me to send a message. Please return call. Thank you

## 2024-07-09 RX ORDER — MONTELUKAST SODIUM 4 MG/1
1 TABLET, CHEWABLE ORAL 2 TIMES DAILY
Qty: 60 TABLET | Refills: 2 | Status: SHIPPED | OUTPATIENT
Start: 2024-07-09 | End: 2024-10-07

## 2024-07-11 ENCOUNTER — OFFICE VISIT (OUTPATIENT)
Age: 64
End: 2024-07-11
Payer: COMMERCIAL

## 2024-07-11 VITALS
WEIGHT: 166 LBS | SYSTOLIC BLOOD PRESSURE: 144 MMHG | DIASTOLIC BLOOD PRESSURE: 81 MMHG | HEIGHT: 64 IN | HEART RATE: 68 BPM | BODY MASS INDEX: 28.34 KG/M2

## 2024-07-11 DIAGNOSIS — E55.9 VITAMIN D DEFICIENCY: Primary | ICD-10-CM

## 2024-07-11 DIAGNOSIS — S92.355A CLOSED NONDISPLACED FRACTURE OF FIFTH METATARSAL BONE OF LEFT FOOT, INITIAL ENCOUNTER: ICD-10-CM

## 2024-07-11 PROCEDURE — 99203 OFFICE O/P NEW LOW 30 MIN: CPT | Performed by: STUDENT IN AN ORGANIZED HEALTH CARE EDUCATION/TRAINING PROGRAM

## 2024-07-11 RX ORDER — TRAMADOL HYDROCHLORIDE 50 MG/1
TABLET ORAL
COMMUNITY
Start: 2024-07-09

## 2024-07-11 NOTE — PROGRESS NOTES
[facility] Podiatric Medicine and Surgery Office Visit    ASSESSMENT     Diagnoses and all orders for this visit:    Vitamin D deficiency  -     DXA bone density spine hip and pelvis; Future  -     Vitamin D 25 hydroxy; Future    Closed nondisplaced fracture of fifth metatarsal bone of left foot, initial encounter  Comments:  5th metatarsal  Orders:  -     DXA bone density spine hip and pelvis; Future  -     Ambulatory Referral to Podiatry  -     Vitamin D 25 hydroxy; Future    Other orders  -     traMADol (ULTRAM) 50 mg tablet         Problem List Items Addressed This Visit    None  Visit Diagnoses       Vitamin D deficiency    -  Primary    Relevant Orders    DXA bone density spine hip and pelvis    Vitamin D 25 hydroxy    Closed nondisplaced fracture of fifth metatarsal bone of left foot, initial encounter        5th metatarsal    Relevant Orders    DXA bone density spine hip and pelvis    Vitamin D 25 hydroxy          PLAN  -Patient was educated regarding their condition.  -We discussed conservative versus surgical management for this pathology.  Given that her fracture is only mildly displaced at the level of the fifth metatarsal neck/distal shaft we will treat this nonoperatively.  -Recommend strict NWB to the left extremity in a CAM walker with crutches or walker for assistance  -We also discussed vitamin D supplementation, f/u vitamin D levels  -RTC 6-weeks for repeat x-ray    -X-ray from 7/5/2024 of left foot reviewed: Oblique fracture of the 5th metatarsal noted at the distal shaft/neck.  I note mild medial displacement of the distal fragment. Increased soft-tissue density and volume noted dorsal to the fracture site.     SUBJECTIVE    Chief Complaint:  Left fifth metatarsal fracture     Patient ID: Francia Lin     7/11/2024: Francia is a pleasant 63-year-old female who presents today for evaluation of left foot pain.  She states that this first occurred last Friday when she was on a commuter train.   "She states that she fell and felt immediate pain to her left foot.  She states that she went to the emergency department on Friday of last week where x-rays were taken revealing a fractured fifth metatarsal bone.  She states that there has been swelling, bruising, pain since then.  She states that she has been wearing her cam boot at all times.  She was given pain medication, has been using ice, and elevating her left lower extremity as well.        The following portions of the patient's history were reviewed and updated as appropriate: allergies, current medications, past family history, past medical history, past social history, past surgical history and problem list.    Review of Systems   Constitutional: Negative.    HENT: Negative.     Respiratory: Negative.     Cardiovascular: Negative.    Gastrointestinal: Negative.    Musculoskeletal:  Positive for arthralgias and gait problem.   Skin: Negative.          OBJECTIVE      /81   Pulse 68   Ht 5' 4\" (1.626 m)   Wt 75.3 kg (166 lb)   BMI 28.49 kg/m²        Physical Exam  Constitutional:       Appearance: Normal appearance.   HENT:      Head: Normocephalic and atraumatic.   Eyes:      General:         Right eye: No discharge.         Left eye: No discharge.   Cardiovascular:      Rate and Rhythm: Normal rate and regular rhythm.      Pulses:           Dorsalis pedis pulses are 2+ on the right side and 2+ on the left side.        Posterior tibial pulses are 2+ on the right side and 2+ on the left side.   Pulmonary:      Effort: Pulmonary effort is normal.      Breath sounds: Normal breath sounds.   Skin:     General: Skin is warm.      Capillary Refill: Capillary refill takes less than 2 seconds.   Neurological:      Sensory: Sensation is intact. No sensory deficit.         MSK:  -Pain on palpation of left 5th metatarsal at the distal shaft  -No gross deformities noted   -Active range of motion lesser digits intact  -MMT 5/5 to all muscle compartments of " the lower extremity  -Equinus not assessed secondary to pain    Vascular:  -DP and PT pulses intact b/l  -Capillary refill time <2 sec b/l  -Digital hair growth: Present  -Skin temp: WNL    Derm:  -No lesions, abrasions, or open wounds noted  -No noted interdigital maceration, peeling, malodor  -No callus formation noted on exam  -Edema and ecchymosis noted to the left lateral foot

## 2024-08-21 ENCOUNTER — APPOINTMENT (OUTPATIENT)
Dept: RADIOLOGY | Facility: CLINIC | Age: 64
End: 2024-08-21
Payer: COMMERCIAL

## 2024-08-21 ENCOUNTER — OFFICE VISIT (OUTPATIENT)
Age: 64
End: 2024-08-21
Payer: COMMERCIAL

## 2024-08-21 VITALS
DIASTOLIC BLOOD PRESSURE: 92 MMHG | HEIGHT: 64 IN | SYSTOLIC BLOOD PRESSURE: 158 MMHG | HEART RATE: 81 BPM | WEIGHT: 166 LBS | BODY MASS INDEX: 28.34 KG/M2

## 2024-08-21 DIAGNOSIS — S92.355D CLOSED NONDISPLACED FRACTURE OF FIFTH METATARSAL BONE OF LEFT FOOT WITH ROUTINE HEALING, SUBSEQUENT ENCOUNTER: Primary | ICD-10-CM

## 2024-08-21 DIAGNOSIS — S92.355A CLOSED NONDISPLACED FRACTURE OF FIFTH METATARSAL BONE OF LEFT FOOT, INITIAL ENCOUNTER: ICD-10-CM

## 2024-08-21 PROCEDURE — 99213 OFFICE O/P EST LOW 20 MIN: CPT | Performed by: STUDENT IN AN ORGANIZED HEALTH CARE EDUCATION/TRAINING PROGRAM

## 2024-08-21 PROCEDURE — 73630 X-RAY EXAM OF FOOT: CPT

## 2024-08-21 NOTE — LETTER
August 21, 2024     Patient: Francia Lin  YOB: 1960  Date of Visit: 8/21/2024      To Whom it May Concern:    Francia Lin is under my professional care. Francia was seen in my office on 8/21/2024. Francia may return to work on 9/23/24.  If you have any questions or concerns, please don't hesitate to call.         Sincerely,          Mert Akins DPM        CC: No Recipients

## 2024-08-21 NOTE — LETTER
August 21, 2024     Patient: Francia Lin  YOB: 1960  Date of Visit: 8/21/2024      To Whom it May Concern:    Francia Lin is under my professional care. Francia was seen in my office on 8/21/2024. Francia may return to work on 9/18/24.    If you have any questions or concerns, please don't hesitate to call.         Sincerely,          Mert Akins DPM        CC: No Recipients

## 2024-08-26 NOTE — PROGRESS NOTES
[facility] Podiatric Medicine and Surgery Office Visit    ASSESSMENT     Diagnoses and all orders for this visit:    Closed nondisplaced fracture of fifth metatarsal bone of left foot with routine healing, subsequent encounter  -     XR foot 3+ vw left; Future         Problem List Items Addressed This Visit    None  Visit Diagnoses       Closed nondisplaced fracture of fifth metatarsal bone of left foot with routine healing, subsequent encounter    -  Primary    Relevant Orders    XR foot 3+ vw left          PLAN  -Patient was educated regarding their condition.  -Continue strict nonweightbearing in a cam boot  -Follow-up vitamin D levels  -RTC 4 weeks for repeat x-rays    -X-ray from 8/21/2024 of left foot reviewed: Oblique fracture of the 5th metatarsal noted at the distal shaft/neck.  I note mild medial displacement of the distal fragment.  Some osseous ingrowth noted across fracture ends representing routine healing.    SUBJECTIVE    Chief Complaint:  Left fifth metatarsal fracture     Patient ID: Francia Lin     7/11/2024: Francia is a pleasant 63-year-old female who presents today for evaluation of left foot pain.  She states that this first occurred last Friday when she was on a commuter train.  She states that she fell and felt immediate pain to her left foot.  She states that she went to the emergency department on Friday of last week where x-rays were taken revealing a fractured fifth metatarsal bone.  She states that there has been swelling, bruising, pain since then.  She states that she has been wearing her cam boot at all times.  She was given pain medication, has been using ice, and elevating her left lower extremity as well.    8/21/2024: Francia is overall doing well today.  She does still endorse pain to her left foot fifth metatarsal.  She states that she has been consistent and staying off her foot since her last visit.        The following portions of the patient's history were reviewed and  "updated as appropriate: allergies, current medications, past family history, past medical history, past social history, past surgical history and problem list.    Review of Systems   Constitutional: Negative.    HENT: Negative.     Respiratory: Negative.     Cardiovascular: Negative.    Gastrointestinal: Negative.    Musculoskeletal:  Positive for arthralgias and gait problem.   Skin: Negative.          OBJECTIVE      /92   Pulse 81   Ht 5' 4\" (1.626 m)   Wt 75.3 kg (166 lb)   BMI 28.49 kg/m²        Physical Exam  Constitutional:       Appearance: Normal appearance.   HENT:      Head: Normocephalic and atraumatic.   Eyes:      General:         Right eye: No discharge.         Left eye: No discharge.   Cardiovascular:      Rate and Rhythm: Normal rate and regular rhythm.      Pulses:           Dorsalis pedis pulses are 2+ on the right side and 2+ on the left side.        Posterior tibial pulses are 2+ on the right side and 2+ on the left side.   Pulmonary:      Effort: Pulmonary effort is normal.      Breath sounds: Normal breath sounds.   Skin:     General: Skin is warm.      Capillary Refill: Capillary refill takes less than 2 seconds.   Neurological:      Sensory: Sensation is intact. No sensory deficit.         MSK:  -Pain on palpation of left 5th metatarsal at the distal shaft  -No gross deformities noted   -Active range of motion lesser digits intact  -MMT 5/5 to all muscle compartments of the lower extremity  -Equinus not assessed secondary to pain    Vascular:  -DP and PT pulses intact b/l  -Capillary refill time <2 sec b/l  -Digital hair growth: Present  -Skin temp: WNL    Derm:  -No lesions, abrasions, or open wounds noted  -No noted interdigital maceration, peeling, malodor  -No callus formation noted on exam  -No edema or ecchymosis noted on exam today.      "

## 2024-09-11 ENCOUNTER — TELEPHONE (OUTPATIENT)
Dept: PHYSICAL THERAPY | Facility: CLINIC | Age: 64
End: 2024-09-11

## 2024-09-11 NOTE — TELEPHONE ENCOUNTER
Patient left message 9/10 to cx all her appts - IE was scheduled for 9/17 - she stated she's going with another PT that's connected to her ortho dr. Aparna Ashley, PT, DPT

## 2024-10-25 ENCOUNTER — TELEPHONE (OUTPATIENT)
Dept: ADMINISTRATIVE | Facility: OTHER | Age: 64
End: 2024-10-25

## 2024-10-25 NOTE — TELEPHONE ENCOUNTER
10/25/24 3:10 PM    The patient was called and a message was left with the return number for Central Scheduling [phone].    Thank you.  Mayi Pavon  [facility]

## 2025-03-18 ENCOUNTER — TELEPHONE (OUTPATIENT)
Dept: GASTROENTEROLOGY | Facility: CLINIC | Age: 65
End: 2025-03-18